# Patient Record
Sex: FEMALE | Race: WHITE | NOT HISPANIC OR LATINO | Employment: PART TIME | URBAN - METROPOLITAN AREA
[De-identification: names, ages, dates, MRNs, and addresses within clinical notes are randomized per-mention and may not be internally consistent; named-entity substitution may affect disease eponyms.]

---

## 2017-01-16 ENCOUNTER — HOSPITAL ENCOUNTER (EMERGENCY)
Facility: MEDICAL CENTER | Age: 37
End: 2017-01-16
Attending: EMERGENCY MEDICINE
Payer: COMMERCIAL

## 2017-01-16 VITALS
TEMPERATURE: 96.6 F | HEIGHT: 69 IN | RESPIRATION RATE: 16 BRPM | DIASTOLIC BLOOD PRESSURE: 74 MMHG | HEART RATE: 83 BPM | OXYGEN SATURATION: 100 % | WEIGHT: 190.48 LBS | BODY MASS INDEX: 28.21 KG/M2 | SYSTOLIC BLOOD PRESSURE: 118 MMHG

## 2017-01-16 DIAGNOSIS — T14.8XXA MUSCLE STRAIN: ICD-10-CM

## 2017-01-16 PROCEDURE — 700111 HCHG RX REV CODE 636 W/ 250 OVERRIDE (IP): Performed by: EMERGENCY MEDICINE

## 2017-01-16 PROCEDURE — 96372 THER/PROPH/DIAG INJ SC/IM: CPT

## 2017-01-16 PROCEDURE — 99283 EMERGENCY DEPT VISIT LOW MDM: CPT

## 2017-01-16 RX ORDER — HYDROCODONE BITARTRATE AND ACETAMINOPHEN 5; 325 MG/1; MG/1
1-2 TABLET ORAL EVERY 6 HOURS PRN
Qty: 10 TAB | Refills: 0 | Status: SHIPPED | OUTPATIENT
Start: 2017-01-16 | End: 2017-05-26

## 2017-01-16 RX ORDER — KETOROLAC TROMETHAMINE 30 MG/ML
30 INJECTION, SOLUTION INTRAMUSCULAR; INTRAVENOUS ONCE
Status: DISCONTINUED | OUTPATIENT
Start: 2017-01-16 | End: 2017-01-16

## 2017-01-16 RX ORDER — KETOROLAC TROMETHAMINE 10 MG/1
10 TABLET, FILM COATED ORAL EVERY 6 HOURS PRN
Qty: 22 TAB | Refills: 2 | Status: SHIPPED | OUTPATIENT
Start: 2017-01-16 | End: 2017-05-26

## 2017-01-16 RX ORDER — KETOROLAC TROMETHAMINE 30 MG/ML
30 INJECTION, SOLUTION INTRAMUSCULAR; INTRAVENOUS ONCE
Status: COMPLETED | OUTPATIENT
Start: 2017-01-16 | End: 2017-01-16

## 2017-01-16 RX ORDER — METHOCARBAMOL 750 MG/1
1500 TABLET, FILM COATED ORAL 3 TIMES DAILY
Qty: 10 TAB | Refills: 0 | Status: SHIPPED | OUTPATIENT
Start: 2017-01-16 | End: 2017-05-26

## 2017-01-16 RX ADMIN — KETOROLAC TROMETHAMINE 30 MG: 30 INJECTION, SOLUTION INTRAMUSCULAR; INTRAVENOUS at 06:01

## 2017-01-16 ASSESSMENT — PAIN SCALES - GENERAL: PAINLEVEL_OUTOF10: 4

## 2017-01-16 NOTE — ED NOTES
Reviewed discharge instructions with patient.  She verbalized understanding.  Able to ambulate out with her .

## 2017-01-16 NOTE — ED AVS SNAPSHOT
Roller Access Code: [email or web address]  Expires: 2/15/2017  6:23 AM    Roller  A secure, online tool to manage your health information     [employer]’s Roller® is a secure, online tool that connects you to your personalized health information from the privacy of your home -- day or night - making it very easy for you to manage your healthcare. Once the activation process is completed, you can even access your medical information using the Roller marivel, which is available for free in the Apple Marivel store or Google Play store.     Roller provides the following levels of access (as shown below):   My Chart Features   [facility] Primary Care Doctor [facility]  Specialists [facility]  Urgent  Care Non-[facility]  Primary Care  Doctor   Email your healthcare team securely and privately 24/7 X X X X   Manage appointments: schedule your next appointment; view details of past/upcoming appointments X      Request prescription refills. X      View recent personal medical records, including lab and immunizations X X X X   View health record, including health history, allergies, medications X X X X   Read reports about your outpatient visits, procedures, consult and ER notes X X X X   See your discharge summary, which is a recap of your hospital and/or ER visit that includes your diagnosis, lab results, and care plan. X X       How to register for Roller:  1. Go to  [email or web address].  2. Click on the Sign Up Now box, which takes you to the New Member Sign Up page. You will need to provide the following information:  a. Enter your Roller Access Code exactly as it appears at the top of this page. (You will not need to use this code after you’ve completed the sign-up process. If you do not sign up before the expiration date, you must request a new code.)   b. Enter your date of birth.   c. Enter your home email address.   d. Click Submit, and follow the next screen’s instructions.  3. Create a Roller ID. This will be your Roller  login ID and cannot be changed, so think of one that is secure and easy to remember.  4. Create a [facility] password. You can change your password at any time.  5. Enter your Password Reset Question and Answer. This can be used at a later time if you forget your password.   6. Enter your e-mail address. This allows you to receive e-mail notifications when new information is available in [facility].  7. Click Sign Up. You can now view your health information.    For assistance activating your [facility] account, call [phone]

## 2017-01-16 NOTE — ED AVS SNAPSHOT
After Visit Summary                                                                                                                Desire Mancini   MRN: [id number]    Department:  [facility], Emergency Dept   Date of Visit:  1/16/2017            [facility], Emergency Dept    [address]    [place] [address]    Phone:  [phone]      You were seen by     Dario Matt M.D.      Your Diagnosis Was     Muscle strain     T14.8       These are the medications you received during your hospitalization from 01/16/2017 0519 to 01/16/2017 0718     Date/Time Order Dose Route Action    01/16/2017 0601 ketorolac (TORADOL) injection 30 mg 30 mg Intramuscular Given      Follow-up Information     1. Follow up with Aristeo Mojica M.D.. Schedule an appointment as soon as possible for a visit today.    Specialty:  Family Medicine    Contact information    [address] [place] [address] [address] [place] [place] [address] [phone]        Medication Information     Review all of your home medications and newly ordered medications with your primary doctor and/or pharmacist as soon as possible. Follow medication instructions as directed by your doctor and/or pharmacist.     Please keep your complete medication list with you and share with your physician. Update the information when medications are discontinued, doses are changed, or new medications (including over-the-counter products) are added; and carry medication information at all times in the event of emergency situations.               Medication List      START taking these medications        Instructions    hydrocodone-acetaminophen 5-325 MG Tabs per tablet   Commonly known as:  NORCO    Take 1-2 Tabs by mouth every 6 hours as needed.   Dose:  1-2 Tab       ketorolac 10 MG Tabs   Commonly known as:  TORADOL    Take 1 Tab by mouth every 6 hours as needed for Mild Pain for up to 22 doses.   Dose:  10 mg       methocarbamol 750 MG Tabs      Commonly known as:  ROBAXIN    Take 2 Tabs by mouth 3 times a day.   Dose:  1500 mg         ASK your doctor about these medications        Instructions    ammonium lactate 12 % Lotn   Commonly known as:  LAC-HYDRIN    Apply 1 Application to affected area(s) 2 times a day as needed.   Dose:  1 Application       SPIRONOLACTONE PO    Take  by mouth 2 Times a Day.                 Discharge Instructions       Ligament Sprain  Ligaments are tough, fibrous tissues that hold bones together at the joints. A sprain can occur when a ligament is stretched. This injury may take several weeks to heal.  HOME CARE INSTRUCTIONS   · Rest the injured area for as long as directed by your caregiver. Then slowly start using the joint as directed by your caregiver and as the pain allows.  · Keep the affected joint raised if possible to lessen swelling.  · Apply ice for 15-20 minutes to the injured area every couple hours for the first half day, then 3-4 times per day for the first 48 hours. Put the ice in a plastic bag and place a towel between the bag of ice and your skin.  · Wear any splinting, casting, or elastic bandage applications as instructed.  · Only take over-the-counter or prescription medicines for pain, discomfort, or fever as directed by your caregiver. Do not use aspirin immediately after the injury unless instructed by your caregiver. Aspirin can cause increased bleeding and bruising of the tissues.  · If you were given crutches, continue to use them as instructed and do not resume weight bearing on the affected extremity until instructed.  SEEK MEDICAL CARE IF:   · Your bruising, swelling, or pain increases.  · You have cold and numb fingers or toes if your arm or leg was injured.  SEEK IMMEDIATE MEDICAL CARE IF:   · Your toes are numb or blue if your leg was injured.  · Your fingers are numb or blue if your arm was injured.  · Your pain is not responding to medicines and continues to stay the same or gets worse.  MAKE  SURE YOU:   · Understand these instructions.  · Will watch your condition.  · Will get help right away if you are not doing well or get worse.  Document Released: 12/15/2001 Document Revised: 03/11/2013 Document Reviewed: 10/13/2009  ExitCare® Patient Information ©2014 [employer].            Patient Information     Patient Information    Following emergency treatment: all patient requiring follow-up care must return either to a private physician or a clinic if your condition worsens before you are able to obtain further medical attention, please return to the emergency room.     Billing Information    At [facility], we work to make the billing process streamlined for our patients.  Our Representatives are here to answer any questions you may have regarding your hospital bill.  If you have insurance coverage and have supplied your insurance information to us, we will submit a claim to your insurer on your behalf.  Should you have any questions regarding your bill, we can be reached online or by phone as follows:  Online: You are able pay your bills online or live chat with our representatives about any billing questions you may have. We are here to help Monday - Friday from 8:00am to 7:30pm and 9:00am - 12:00pm on Saturdays.  Please visit [email or web address]  for more information.   Phone:  [phone] or [phone]    Please note that your emergency physician, surgeon, pathologist, radiologist, anesthesiologist, and other specialists are not employed by [facility] and will therefore bill separately for their services.  Please contact them directly for any questions concerning their bills at the numbers below:     Emergency Physician Services:  [phone]  [facility]:  [phone]  [facility]:  [phone]  [facility]:  [phone]    1. Your final bill may vary from the amount quoted upon discharge if all procedures are  not complete at that time, or if your doctor has additional procedures of which we are not aware. You will receive an additional bill if you return to the Emergency Department at [facility] for suture removal regardless of the facility of which the sutures were placed.     2. Please arrange for settlement of this account at the emergency registration.    3. All self-pay accounts are due in full at the time of treatment.  If you are unable to meet this obligation then payment is expected within 4-5 days.     4. If you have had radiology studies (CT, X-ray, Ultrasound, MRI), you have received a preliminary result during your emergency department visit. Please contact the radiology department [phone] to receive a copy of your final result. Please discuss the Final result with your primary physician or with the follow up physician provided.     Crisis Hotline:  [place] Crisis Hotline:  8-043-DNVCXHU or 1-905.805.3506  [place] Crisis Hotline:    1-238.441.7810 or 657-759-9865         ED Discharge Follow Up Questions    1. In order to provide you with very good care, we would like to follow up with a phone call in the next few days.  May we have your permission to contact you?     YES /  NO    2. What is the best phone number to call you? (       )_____-__________    3. What is the best time to call you?      Morning  /  Afternoon  /  Evening                   Patient Signature:  ____________________________________________________________    Date:  ____________________________________________________________      Your appointments     Jan 26, 2017 10:20 AM   Established Patient with Aristeo Mojica M.D.   [facility] (--)    [address]  [address]  [facility] [address]   [phone]           You will be receiving a confirmation call a few days before your appointment from our automated call confirmation system.

## 2017-01-16 NOTE — DISCHARGE INSTRUCTIONS
Ligament Sprain  Ligaments are tough, fibrous tissues that hold bones together at the joints. A sprain can occur when a ligament is stretched. This injury may take several weeks to heal.  HOME CARE INSTRUCTIONS   · Rest the injured area for as long as directed by your caregiver. Then slowly start using the joint as directed by your caregiver and as the pain allows.  · Keep the affected joint raised if possible to lessen swelling.  · Apply ice for 15-20 minutes to the injured area every couple hours for the first half day, then 3-4 times per day for the first 48 hours. Put the ice in a plastic bag and place a towel between the bag of ice and your skin.  · Wear any splinting, casting, or elastic bandage applications as instructed.  · Only take over-the-counter or prescription medicines for pain, discomfort, or fever as directed by your caregiver. Do not use aspirin immediately after the injury unless instructed by your caregiver. Aspirin can cause increased bleeding and bruising of the tissues.  · If you were given crutches, continue to use them as instructed and do not resume weight bearing on the affected extremity until instructed.  SEEK MEDICAL CARE IF:   · Your bruising, swelling, or pain increases.  · You have cold and numb fingers or toes if your arm or leg was injured.  SEEK IMMEDIATE MEDICAL CARE IF:   · Your toes are numb or blue if your leg was injured.  · Your fingers are numb or blue if your arm was injured.  · Your pain is not responding to medicines and continues to stay the same or gets worse.  MAKE SURE YOU:   · Understand these instructions.  · Will watch your condition.  · Will get help right away if you are not doing well or get worse.  Document Released: 12/15/2001 Document Revised: 03/11/2013 Document Reviewed: 10/13/2009  ExitCare® Patient Information ©2014 [employer].

## 2017-01-16 NOTE — ED NOTES
Pt brought back to rm GR 27 from triage. Pt able to transfer self to bed, call light in reach. Chart up for ERP.

## 2017-01-16 NOTE — ED AVS SNAPSHOT
1/16/2017          Desire Mancini  [address] [place] [address]    Dear Desire:    [facility] wants to ensure your discharge home is safe and you or your loved ones have had all your questions answered regarding your care after you leave the hospital.    You may receive a telephone call within two days of your discharge.  This call is to make certain you understand your discharge instructions as well as ensure we provided you with the best care possible during your stay with us.     The call will only last approximately 3-5 minutes and will be done by a nurse.    Once again, we want to ensure your discharge home is safe and that you have a clear understanding of any next steps in your care.  If you have any questions or concerns, please do not hesitate to contact us, we are here for you.  Thank you for choosing [facility] for your healthcare needs.    Sincerely,    Calderon Adamson    [facility]

## 2017-01-16 NOTE — ED PROVIDER NOTES
"ED Provider Note    CHIEF COMPLAINT  Chief Complaint   Patient presents with   • Neck Pain     right sided       HPI  Desire Mancini is a 36 y.o. female who presents with sudden onset neck pain, mostly on the right when she rolled over in bed tonight. Did not fall out of bed. No recent trauma no motor vehicle accidents no falls. Only rolled over in bed and had sudden onset right neck pain. No difficulty rotating her neck. No similar episodes. States she is not pregnant. No paresthesias in her arms or legs. No weakness    REVIEW OF SYSTEMS  See HPI for further details.     PAST MEDICAL HISTORY  Past Medical History   Diagnosis Date   • Acne          SOCIAL HISTORY        CURRENT MEDICATIONS  Home Medications     Reviewed by Sonya Rose R.N. (Registered Nurse) on 01/16/17 at 0531  Med List Status: Partial    Medication Last Dose Status    ammonium lactate (LAC-HYDRIN) 12 % Lotion Unknown Active    SPIRONOLACTONE PO  Active                ALLERGIES  No Known Allergies    PHYSICAL EXAM  VITAL SIGNS: /74 mmHg  Pulse 83  Temp(Src) 35.9 °C (96.6 °F)  Resp 16  Ht 1.753 m (5' 9\")  Wt 86.4 kg (190 lb 7.6 oz)  BMI 28.12 kg/m2  SpO2 100%  Constitutional: Well developed, Well nourished, appears to be in acute pain  HENT: Normocephalic, Atraumatic, Bilateral external ears normal, Oropharynx moist, No oral exudates, Nose normal.   Eyes: PERRLA, EOMI, Conjunctiva normal, No discharge.   Neck: Normal range of motion, No tenderness, Supple, No stridor. Neck is nontender but pain with any rotation right or left, pain with flexion and extension  Cardiovascular:  Heart sounds are normal no murmurs or rubs  Thorax & Lungs: Lungs are clear equal breath hands bilaterally no rhonchi rales or wheezes. Chest wall motion is nonlabored  .   Skin: Warm, Dry, No erythema, No rash.   Neurologic: Alert & oriented x 3, Normal motor function, Normal sensory function, No focal deficits noted.         COURSE & MEDICAL DECISION " MAKING  Pertinent Labs & Imaging studies reviewed. (See chart for details)    She rolled over in bed tonight, sudden onset right neck pain. No similar episodes. No recent trauma. States she is not pregnant, was given Toradol IV.    She had good relief with Toradol however still having some more pain. I explained to her that I do not think x-rays are necessary at this time.. She will go home, heat massage, also prescription for Toradol, Norco, Robaxin, I have checked with   FINAL IMPRESSION  1.  1. Muscle strain        2.   3.    Disposition:  Discharge instructions are understood. This patient is to return if fever vomiting or no better in 12 hours. Follow up with the [facility] clinic or private physician. Information sheets on muscle strain    Electronically signed by: Dario Matt, 1/16/2017 5:45 AM

## 2017-01-26 ENCOUNTER — OFFICE VISIT (OUTPATIENT)
Dept: MEDICAL GROUP | Facility: MEDICAL CENTER | Age: 37
End: 2017-01-26
Payer: COMMERCIAL

## 2017-01-26 VITALS
SYSTOLIC BLOOD PRESSURE: 112 MMHG | BODY MASS INDEX: 27.99 KG/M2 | OXYGEN SATURATION: 98 % | TEMPERATURE: 97.5 F | HEIGHT: 69 IN | RESPIRATION RATE: 20 BRPM | DIASTOLIC BLOOD PRESSURE: 72 MMHG | HEART RATE: 78 BPM | WEIGHT: 189 LBS

## 2017-01-26 DIAGNOSIS — G47.30 SLEEP APNEA, UNSPECIFIED TYPE: ICD-10-CM

## 2017-01-26 DIAGNOSIS — S16.1XXA NECK MUSCLE STRAIN, INITIAL ENCOUNTER: ICD-10-CM

## 2017-01-26 PROCEDURE — 99214 OFFICE O/P EST MOD 30 MIN: CPT | Performed by: FAMILY MEDICINE

## 2017-01-26 ASSESSMENT — PATIENT HEALTH QUESTIONNAIRE - PHQ9: CLINICAL INTERPRETATION OF PHQ2 SCORE: 2

## 2017-01-26 NOTE — MR AVS SNAPSHOT
"Desire Mancini   2017 10:20 AM   Office Visit   MRN: [id number]    Department:  [facility]   Dept Phone:  [phone]    Description:  Female : 1980   Provider:  Aristeo Mojica M.D.           Reason for Visit     Other patient states she has sleep apnea    Other patient was seen in ER for Pinch Nerve      Allergies as of 2017     No Known Allergies      You were diagnosed with     Sleep apnea, unspecified type   [[id number]]         Vital Signs     Blood Pressure Pulse Temperature Respirations Height Weight    112/72 mmHg 78 36.4 °C (97.5 °F) 20 1.753 m (5' 9\") 85.73 kg (189 lb)    Body Mass Index Oxygen Saturation Last Menstrual Period Breastfeeding? Smoking Status       27.90 kg/m2 98% 01/15/2017 No Never Smoker        Basic Information     Date Of Birth Sex Race Ethnicity Preferred Language    1980 Female White Non- English      Health Maintenance        Date Due Completion Dates    IMM INFLUENZA (1) 2018 (Originally 2016) ---    IMM DTaP/Tdap/Td Vaccine (1 - Tdap) 2018 (Originally 1999) ---    PAP SMEAR 3/1/2018 3/1/2015 (Done)    Override on 3/1/2015: Done (Dr. [facility] [place] [facility])            Current Immunizations     No immunizations on file.      Below and/or attached are the medications your provider expects you to take. Review all of your home medications and newly ordered medications with your provider and/or pharmacist. Follow medication instructions as directed by your provider and/or pharmacist. Please keep your medication list with you and share with your provider. Update the information when medications are discontinued, doses are changed, or new medications (including over-the-counter products) are added; and carry medication information at all times in the event of emergency situations     Allergies:  No Known Allergies          Medications  Valid as of: 2017 - 10:52 AM    Generic Name Brand Name Tablet Size " Instructions for use    Ammonium Lactate (Lotion) LAC-HYDRIN 12 % Apply 1 Application to affected area(s) 2 times a day as needed.        Hydrocodone-Acetaminophen (Tab) NORCO 5-325 MG Take 1-2 Tabs by mouth every 6 hours as needed.        Ketorolac Tromethamine (Tab) TORADOL 10 MG Take 1 Tab by mouth every 6 hours as needed for Mild Pain for up to 22 doses.        Methocarbamol (Tab) ROBAXIN 750 MG Take 2 Tabs by mouth 3 times a day.        Spironolactone   Take  by mouth 2 Times a Day.        .                 Medicines prescribed today were sent to:     [id number] - [place], [place] - [address]    [address] [place] [address]    Phone: [phone] Fax: [phone]    Open 24 Hours?: No      Medication refill instructions:       If your prescription bottle indicates you have medication refills left, it is not necessary to call your provider’s office. Please contact your pharmacy and they will refill your medication.    If your prescription bottle indicates you do not have any refills left, you may request refills at any time through one of the following ways: The online [employer] system (except Urgent Care), by calling your provider’s office, or by asking your pharmacy to contact your provider’s office with a refill request. Medication refills are processed only during regular business hours and may not be available until the next business day. Your provider may request additional information or to have a follow-up visit with you prior to refilling your medication.   *Please Note: Medication refills are assigned a new Rx number when refilled electronically. Your pharmacy may indicate that no refills were authorized even though a new prescription for the same medication is available at the pharmacy. Please request the medicine by name with the pharmacy before contacting your provider for a refill.        Referral     A referral request has been sent to our patient care coordination department. Please allow  3-5 business days for us to process this request and contact you either by phone or mail. If you do not hear from us by the 5th business day, please call us at [phone].           [employer] Access Code: [email or web address]  Expires: 2/15/2017  6:23 AM    [employer]  A secure, online tool to manage your health information     [employer]’s Applied Cavitation® is a secure, online tool that connects you to your personalized health information from the privacy of your home -- day or night - making it very easy for you to manage your healthcare. Once the activation process is completed, you can even access your medical information using the [employer] marivel, which is available for free in the Apple Marivel store or Google Play store.     [employer] provides the following levels of access (as shown below):   My Chart Features   [facility] Primary [facility] Doctor [facility]  Specialists [facility]  Urgent  Care Non-[facility]  Primary Care  Doctor   Email your healthcare team securely and privately 24/7 X X X    Manage appointments: schedule your next appointment; view details of past/upcoming appointments X      Request prescription refills. X      View recent personal medical records, including lab and immunizations X X X X   View health record, including health history, allergies, medications X X X X   Read reports about your outpatient visits, procedures, consult and ER notes X X X X   See your discharge summary, which is a recap of your hospital and/or ER visit that includes your diagnosis, lab results, and care plan. X X       How to register for Applied Cavitation:  1. Go to  [email or web address].  2. Click on the Sign Up Now box, which takes you to the New Member Sign Up page. You will need to provide the following information:  a. Enter your Applied Cavitation Access Code exactly as it appears at the top of this page. (You will not need to use this code after you’ve completed the sign-up process. If you do not sign up before the expiration date, you must request a new  code.)   b. Enter your date of birth.   c. Enter your home email address.   d. Click Submit, and follow the next screen’s instructions.  3. Create a [employer] ID. This will be your [employer] login ID and cannot be changed, so think of one that is secure and easy to remember.  4. Create a [employer] password. You can change your password at any time.  5. Enter your Password Reset Question and Answer. This can be used at a later time if you forget your password.   6. Enter your e-mail address. This allows you to receive e-mail notifications when new information is available in [employer].  7. Click Sign Up. You can now view your health information.    For assistance activating your [employer] account, call [phone]

## 2017-01-27 NOTE — PROGRESS NOTES
Chief Complaint   Patient presents with   • Other     patient states she has sleep apnea   • Other     patient was seen in ER for Pinch Nerve     Multiple medical problems    HISTORY OF PRESENT ILLNESS: Patient is a 36 y.o. female established patient who presents today for the following.      1. Sleep apnea, unspecified type  Patient has witnessed at  episodes and snoring. She is continuously fatigued primarily during midday. She feels like she sleeps well however. We discussed sleep study. She did have one approximately 4 years ago which did not reveal sleep apnea.    2. Neck muscle strain, initial encounter  This is a new problem for the patient. She was sleeping on her neck and awoke with severe pain going into the right scapula. She went to the emergency room and had workup. She states that it is virtually resolved itself. I discussed if it resolved we would not do anything further, but if it persists may need to consider imaging.      No problem-specific assessment & plan notes found for this encounter.      She  has a past medical history of Acne.    There are no active problems to display for this patient.      Allergies:Review of patient's allergies indicates no known allergies.    Current Outpatient Prescriptions   Medication Sig Dispense Refill   • ammonium lactate (LAC-HYDRIN) 12 % Lotion Apply 1 Application to affected area(s) 2 times a day as needed. 1 Bottle 2   • SPIRONOLACTONE PO Take  by mouth 2 Times a Day.     • hydrocodone-acetaminophen (NORCO) 5-325 MG Tab per tablet Take 1-2 Tabs by mouth every 6 hours as needed. 10 Tab 0   • ketorolac (TORADOL) 10 MG Tab Take 1 Tab by mouth every 6 hours as needed for Mild Pain for up to 22 doses. 22 Tab 2   • methocarbamol (ROBAXIN) 750 MG Tab Take 2 Tabs by mouth 3 times a day. 10 Tab 0     No current facility-administered medications for this visit.       Social History   Substance Use Topics   • Smoking status: Never Smoker    • Smokeless tobacco: Never  "Used   • Alcohol Use: Yes      Comment: occ       No family history on file.    Health Maintenance:      Review of Systems   No fever, chills, nausea, vomiting, diarrhea, chest pain or shortness of breath.  See HPI    Exam:  Blood pressure 112/72, pulse 78, temperature 36.4 °C (97.5 °F), resp. rate 20, height 1.753 m (5' 9\"), weight 85.73 kg (189 lb), last menstrual period 01/15/2017, SpO2 98 %, not currently breastfeeding. Body mass index is 27.9 kg/(m^2).  Constitutional:  NAD, well appearing.  HEENT:   NC/AT, PERRLA, EOMI, OP clear, no lymphadenopathy, no thyromegaly.  Mallampati IV  Cardiovascular: RRR.   No m/r/g. No carotid bruits.       Lungs:   CTAB, no w/r/r, no respiratory distress.  Abdomen: Soft, NT/ND + BS, no masses, no suprapubic tenderness, no hepatomegaly.  Extremities:  2+ DP and radial pulses bilaterally.  No c/c/e.  Skin:  Warm and dry.    Neurologic: Alert & oriented x 3, CN II-XII grossly intact, strength and sensation grossly intact.  No focal deficits noted.  Psychiatric:  Affect normal, mood normal, judgment normal.    Assessment/Plan:     1. Sleep apnea, unspecified type  Patient has with witnessed apneic episodes, snoring and a Mallin potty score of 3-4. Very indicative of sleep apnea. Sleep study has been ordered. Monitor  - REFERRAL TO SLEEP STUDIES    2. Neck muscle strain, initial encounter  This is a new problem for the patient. Discussed with the patient continuing her muscle relaxant and anti-inflammatory. Patient seems to be improving. No imaging warranted at this point.        Followup: No Follow-up on file.    Please note that this dictation was created using voice recognition software. I have made every reasonable attempt to correct obvious errors, but I expect that there are errors of grammar and possibly content that I did not discover before finalizing the note.      "

## 2017-04-03 ENCOUNTER — SLEEP CENTER VISIT (OUTPATIENT)
Dept: SLEEP MEDICINE | Facility: MEDICAL CENTER | Age: 37
End: 2017-04-03
Payer: COMMERCIAL

## 2017-04-03 VITALS
DIASTOLIC BLOOD PRESSURE: 60 MMHG | RESPIRATION RATE: 16 BRPM | WEIGHT: 187 LBS | TEMPERATURE: 97.9 F | HEIGHT: 69 IN | HEART RATE: 88 BPM | BODY MASS INDEX: 27.7 KG/M2 | SYSTOLIC BLOOD PRESSURE: 98 MMHG | OXYGEN SATURATION: 97 %

## 2017-04-03 DIAGNOSIS — G47.33 OBSTRUCTIVE SLEEP APNEA: ICD-10-CM

## 2017-04-03 PROCEDURE — 99244 OFF/OP CNSLTJ NEW/EST MOD 40: CPT | Performed by: INTERNAL MEDICINE

## 2017-04-03 RX ORDER — ZOLPIDEM TARTRATE 5 MG/1
TABLET ORAL
Qty: 3 TAB | Refills: 0 | Status: SHIPPED | OUTPATIENT
Start: 2017-04-03 | End: 2017-05-26

## 2017-04-03 RX ORDER — DAPSONE 50 MG/G
GEL TOPICAL
COMMUNITY
Start: 2017-02-07 | End: 2018-08-22

## 2017-04-03 RX ORDER — FLUCONAZOLE 150 MG/1
TABLET ORAL
COMMUNITY
Start: 2017-03-22 | End: 2017-03-28

## 2017-04-03 NOTE — PROGRESS NOTES
CC: Kindly referred for evaluation of sleep apnea    HPI:     Ms. Mancini is a 36-year-old female referred by Dr. Mojica for reevaluation of obstructive sleep apnea syndrome. The patient had a prior sleep study done in 2012 which did not show significant apnea. Her apnea hypopnea index was 2.0 and her lowest saturation was 92% on this prior study.    The patient's signs and symptoms include daytime fatigue and sleepiness, witnessed apneas, waking up gasping for air and choking, feeling like her throat is closing, tiredness no matter how much sleep she gets, too little sleep at night, difficulty breathing at night, loud and disturbing snoring, restless legs, sleep talking, teeth grinding, and morning headaches. Her father has been diagnosed to have sleep apnea and uses CPAP. Sx may be sporadic.    The episodes of nocturnal choking are so scary that she might want treatment with cpap even if she doesn't qualify by exceeding the AHI of 5.0.    Her [place] score is 10.                     There are no active problems to display for this patient.      Past Medical History   Diagnosis Date   • Acne         No past surgical history on file.    No family history on file.    Social History     Social History   • Marital Status:      Spouse Name: N/A   • Number of Children: N/A   • Years of Education: N/A     Occupational History   • Not on file.     Social History Main Topics   • Smoking status: Never Smoker    • Smokeless tobacco: Never Used   • Alcohol Use: 0.0 oz/week     0 Standard drinks or equivalent per week      Comment: occ   • Drug Use: No   • Sexual Activity: Not on file     Other Topics Concern   • Not on file     Social History Narrative       Current Outpatient Prescriptions   Medication Sig Dispense Refill   • ACZONE 5 % Gel      • SPIRONOLACTONE PO Take  by mouth 2 Times a Day.     • hydrocodone-acetaminophen (NORCO) 5-325 MG Tab per tablet Take 1-2 Tabs by mouth every 6 hours as needed. 10 Tab 0   •  "ketorolac (TORADOL) 10 MG Tab Take 1 Tab by mouth every 6 hours as needed for Mild Pain for up to 22 doses. 22 Tab 2   • methocarbamol (ROBAXIN) 750 MG Tab Take 2 Tabs by mouth 3 times a day. 10 Tab 0   • ammonium lactate (LAC-HYDRIN) 12 % Lotion Apply 1 Application to affected area(s) 2 times a day as needed. 1 Bottle 2     No current facility-administered medications for this visit.    \"CURRENT RX\"    ALLERGIES: Review of patient's allergies indicates no known allergies.    ROS  Constitutional: Denies fever, chills, sweats, fatigue, weight loss.   Eyes: Denies glasses, vision loss, pain, drainage, double vision.   Ears/Nose/Mouth/Throat: Denies earache, difficulty hearing, ringing/buzzing in ears, rhinitis/nasal congestion, injury, persistent hoarseness, decayed teeth/toothaches. [place] of recurrent sore throats  Cardiovascular: Denies chest pain, tightness, palpitations, swelling in legs/feet, fainting, difficulty breathing when lying down but gets better when sitting up.   Respiratory: Denies shortness of breath, cough, sputum, wheezing, painful breathing, coughing up blood.   Sleep: See history of present illness  Gastrointestinal: Denies heartburn, difficulty swallowing, nausea, abdominal pain, diarrhea, constipation.   Genitourinary: Denies frequent urination, painful urination, blood in urine, discharge.   Musculoskeletal: Denies painful joints, sore muscles, back pain.   Integumentary: Denies rashes, lumps, color changes.   Neurological: Denies dizziness, weakness. Complains of frequent headaches    PHYSICAL EXAM    Ht 1.753 m (5' 9.02\")  Wt 84.823 kg (187 lb)  BMI 27.60 kg/m2  Appearance: Well-nourished, well-developed, no acute distress  Eyes:  PERRLA, EOMI  Hearing:  Grossly intact  Nose:  Normal, no lesions or deformities, turbinates moist  Oropharynx:  Tongue normal, posterior pharynx without erythema or exudate  Mallampati classification: 2   Neck: Supple, trachea midline, no masses  Respiratory " effort:  No intercostal retractions or use of accessory muscles  Lung auscultation:  No wheezes rhonchi rubs or rales  Cardiac auscultation:  No murmurs, rubs, or gallops, no regular rhythm, normal rate  Abdomen:  No tenderness, no organomegaly  Extremities:  No cyanosis, clubbing, edema  Gait and Station:  Normal  Digits and nails: No clubbing, cyanosis, petechiae, or nodes  Musculoskeletal:  Grossly normal  Skin:  No rashes  Orientation:  Oriented time, place, and person  Mood and affect:  No depression, anxiety, agitation  Judgment:  Intact    PROBLEMS:  1. Obstructive sleep apnea    - POLYSOMNOGRAPHY, 4 OR MORE; Future  - zolpidem (AMBIEN) 5 MG Tab; Take 1-2 tablets by mouth every evening as needed for insomnia. Bring to sleep study.  Dispense: 3 Tab; Refill: 0      PLAN:     The patient has signs and symptoms consistent with obstructive sleep apnea hypopnea syndrome. Will schedule to have a nocturnal polysomnogram using zolpidem to assist with sleep onset and maintenance should the need arise. Will return after the results are available to determine further diagnostic needs and/or treatment options.    The risks of untreated sleep apnea were discussed with the patient at length. Patients with AMELIA are at increased risk of cardiovascular disease including coronary artery disease, systemic arterial hypertension, pulmonary arterial hypertension, cardiac arrythmias, and stroke. AMELIA patients have an increased risk of motor vehicle accidents, type 2 diabetes, chronic kidney disease, and non-alcoholic liver disease. The patient was advised to avoid driving a motor vehicle when drowsy.

## 2017-04-03 NOTE — MR AVS SNAPSHOT
"        Desire Mancini   4/3/2017 10:00 AM   Sleep Center Visit   MRN: [id number]    Department:  [facility]   Dept Phone:  [phone]    Description:  Female : 1980   Provider:  Ced Wang M.D.           Reason for Visit     New Patient Re-Evaluate AMELIA; Last sleep study  with [facility]      Allergies as of 4/3/2017     No Known Allergies      You were diagnosed with     Obstructive sleep apnea   [747970]         Vital Signs     Blood Pressure Pulse Temperature Respirations Height Weight    98/60 mmHg 88 36.6 °C (97.9 °F) 16 1.753 m (5' 9.02\") 84.823 kg (187 lb)    Body Mass Index Oxygen Saturation Smoking Status             27.60 kg/m2 97% Passive Smoke Exposure - Never Smoker         Basic Information     Date Of Birth Sex Race Ethnicity Preferred Language    1980 Female White Non- English      Your appointments     May 18, 2017  9:05 PM   Sleep Study Diagnostic with SLEEP TECH   [facility] (--)    [address]  [facility] [address]  [email or web address] [address]   [phone]            May 26, 2017 11:20 AM   Follow UP with ARIANNE Powers   [facility] (--)    [address]  [email or web address] [id number] [phone]              Problem List              ICD-10-CM Priority Class Noted - Resolved    Obstructive sleep apnea G47.33   4/3/2017 - Present      Health Maintenance        Date Due Completion Dates    IMM DTaP/Tdap/Td Vaccine (1 - Tdap) 2018 (Originally 1999) ---    PAP SMEAR 3/1/2018 3/1/2015 (Done)    Override on 3/1/2015: Done (Dr. Dat Alas [facility]ynoc[facility])            Current Immunizations     No immunizations on file.      Below and/or attached are the medications your provider expects you to take. Review all of your home medications and newly ordered medications with your provider and/or pharmacist. Follow medication instructions as directed by your provider and/or pharmacist. Please keep your " medication list with you and share with your provider. Update the information when medications are discontinued, doses are changed, or new medications (including over-the-counter products) are added; and carry medication information at all times in the event of emergency situations     Allergies:  No Known Allergies          Medications  Valid as of: April 03, 2017 - 10:42 AM    Generic Name Brand Name Tablet Size Instructions for use    Ammonium Lactate (Lotion) LAC-HYDRIN 12 % Apply 1 Application to affected area(s) 2 times a day as needed.        Dapsone (Gel) ACZONE 5 %         Hydrocodone-Acetaminophen (Tab) NORCO 5-325 MG Take 1-2 Tabs by mouth every 6 hours as needed.        Ketorolac Tromethamine (Tab) TORADOL 10 MG Take 1 Tab by mouth every 6 hours as needed for Mild Pain for up to 22 doses.        Methocarbamol (Tab) ROBAXIN 750 MG Take 2 Tabs by mouth 3 times a day.        Zolpidem Tartrate (Tab) AMBIEN 5 MG Take 1-2 tablets by mouth every evening as needed for insomnia. Bring to sleep study.        .                 Medicines prescribed today were sent to:     [facility] - [place], [place] - [address]    [address] [place] [address]    Phone: [phone] Fax: [phone]    Open 24 Hours?: No      Medication refill instructions:       If your prescription bottle indicates you have medication refills left, it is not necessary to call your provider’s office. Please contact your pharmacy and they will refill your medication.    If your prescription bottle indicates you do not have any refills left, you may request refills at any time through one of the following ways: The online [employer] system (except Urgent Care), by calling your provider’s office, or by asking your pharmacy to contact your provider’s office with a refill request. Medication refills are processed only during regular business hours and may not be available until the next business day. Your provider may request additional  information or to have a follow-up visit with you prior to refilling your medication.   *Please Note: Medication refills are assigned a new Rx number when refilled electronically. Your pharmacy may indicate that no refills were authorized even though a new prescription for the same medication is available at the pharmacy. Please request the medicine by name with the pharmacy before contacting your provider for a refill.        Your To Do List     Future Labs/Procedures Complete By Expires    POLYSOMNOGRAPHY, 4 OR MORE  As directed 4/3/2018         [facility] Status: Patient Declined

## 2017-05-19 ENCOUNTER — SLEEP STUDY (OUTPATIENT)
Dept: SLEEP MEDICINE | Facility: MEDICAL CENTER | Age: 37
End: 2017-05-19
Attending: INTERNAL MEDICINE
Payer: COMMERCIAL

## 2017-05-19 DIAGNOSIS — G47.33 OBSTRUCTIVE SLEEP APNEA: ICD-10-CM

## 2017-05-19 PROCEDURE — 95810 POLYSOM 6/> YRS 4/> PARAM: CPT | Performed by: INTERNAL MEDICINE

## 2017-05-19 NOTE — MR AVS SNAPSHOT
Desire Louis Live   2017 8:05 PM   Sleep Study   MRN: [id number]    Department:  [facility]   Dept Phone:  [phone]    Description:  Female : 1980   Provider:  Chinyere Mcdowell M.D.           Allergies as of 2017     No Known Allergies      You were diagnosed with     Obstructive sleep apnea   [490607]         Vital Signs     Smoking Status                   Passive Smoke Exposure - Never Smoker           Basic Information     Date Of Birth Sex Race Ethnicity Preferred Language    1980 Female White Non- English      Your appointments     May 26, 2017 11:20 AM   Follow UP with ARIANNE Powers   [facility] (--)    [address]  [place] [id number] [phone]              Problem List              ICD-10-CM Priority Class Noted - Resolved    Obstructive sleep apnea G47.33   4/3/2017 - Present      Health Maintenance        Date Due Completion Dates    IMM DTaP/Tdap/Td Vaccine (1 - Tdap) 2018 (Originally 1999) ---    PAP SMEAR 3/1/2018 3/1/2015 (Done)    Override on 3/1/2015: Done (Dr. Dat Alas Gynocology)            Current Immunizations     No immunizations on file.      Below and/or attached are the medications your provider expects you to take. Review all of your home medications and newly ordered medications with your provider and/or pharmacist. Follow medication instructions as directed by your provider and/or pharmacist. Please keep your medication list with you and share with your provider. Update the information when medications are discontinued, doses are changed, or new medications (including over-the-counter products) are added; and carry medication information at all times in the event of emergency situations     Allergies:  No Known Allergies          Medications  Valid as of: May 20, 2017 -  6:17 AM    Generic Name Brand Name Tablet Size Instructions for use    Ammonium Lactate (Lotion)  LAC-HYDRIN 12 % Apply 1 Application to affected area(s) 2 times a day as needed.        Dapsone (Gel) ACZONE 5 %         Hydrocodone-Acetaminophen (Tab) NORCO 5-325 MG Take 1-2 Tabs by mouth every 6 hours as needed.        Ketorolac Tromethamine (Tab) TORADOL 10 MG Take 1 Tab by mouth every 6 hours as needed for Mild Pain for up to 22 doses.        Methocarbamol (Tab) ROBAXIN 750 MG Take 2 Tabs by mouth 3 times a day.        Zolpidem Tartrate (Tab) AMBIEN 5 MG Take 1-2 tablets by mouth every evening as needed for insomnia. Bring to sleep study.        .                 Medicines prescribed today were sent to:     [facility] - [place], [place] - [address]    [address] [place] [address]    Phone: [phone] Fax: [phone]    Open 24 Hours?: No      Medication refill instructions:       If your prescription bottle indicates you have medication refills left, it is not necessary to call your provider’s office. Please contact your pharmacy and they will refill your medication.    If your prescription bottle indicates you do not have any refills left, you may request refills at any time through one of the following ways: The online [employer] system (except Urgent Care), by calling your provider’s office, or by asking your pharmacy to contact your provider’s office with a refill request. Medication refills are processed only during regular business hours and may not be available until the next business day. Your provider may request additional information or to have a follow-up visit with you prior to refilling your medication.   *Please Note: Medication refills are assigned a new Rx number when refilled electronically. Your pharmacy may indicate that no refills were authorized even though a new prescription for the same medication is available at the pharmacy. Please request the medicine by name with the pharmacy before contacting your provider for a refill.           MyChart Status: Patient Declined

## 2017-05-22 NOTE — PROCEDURES
Clinical Comments:  The patient underwent a comprehensive polysomnogram using the standard montage for measurement of parameters of sleep, respiratory events, movement abnormalities, heart rate and rhythm. A microphone was used to monitor snoring.      INTERPRETATION:  The total recording time was 387.6 minutes with a sleep period of 380.3 minutes and the total sleep time was 371.8 minutes with a sleep efficiency of 95.9%.  The sleep latency was 7.3 minutes, and REM latency was 141.0 minutes.  The patient experienced 86 arousals in total, for an arousal index of 13.9    RESPIRATORY: The patient had 7 apneas in total.  Of these, 6 were obstructive apneas, and 1 were central apneas.  This resulted in an apnea index (AI) of 1.1.  The patient had 10 hypopneas, for a hypopnea index of 1.6.  The overall AHI was 2.7, while the AHI during Stage R sleep was 6.6.  AHI while supine was 2.7.    OXIMETRY: Oxygen saturation monitoring showed a mean SpO2 of 95.3%, with a minimum oxygen saturation of 91.0%.  Oxygen saturations were less than or = 89% for 0.0 minutes of sleep time.    CARDIAC: The highest heart rate during the recording was 106.0 beats per minute.  The average heart rate during sleep was 61.0 bpm.    LIMB MOVEMENTS: There were a total of 0 PLMs during sleep, of which 0 were PLMs arousals.  This resulted in a PLMS index of 0.0.    Overnight sleep study done May 19 revealed evidence of excellent sleep efficiency, 97% with a total sleep time of 6.2 hours. All stages of sleep observed. Limb movements not evident. No evidence of sleep-disordered breathing was seen

## 2017-05-26 ENCOUNTER — SLEEP CENTER VISIT (OUTPATIENT)
Dept: SLEEP MEDICINE | Facility: MEDICAL CENTER | Age: 37
End: 2017-05-26
Payer: COMMERCIAL

## 2017-05-26 VITALS
WEIGHT: 188 LBS | RESPIRATION RATE: 16 BRPM | SYSTOLIC BLOOD PRESSURE: 112 MMHG | TEMPERATURE: 98.1 F | BODY MASS INDEX: 27.85 KG/M2 | OXYGEN SATURATION: 97 % | DIASTOLIC BLOOD PRESSURE: 74 MMHG | HEART RATE: 90 BPM | HEIGHT: 69 IN

## 2017-05-26 DIAGNOSIS — G47.8 UPPER AIRWAY RESISTANCE SYNDROME: ICD-10-CM

## 2017-05-26 PROBLEM — G47.33 OBSTRUCTIVE SLEEP APNEA: Status: RESOLVED | Noted: 2017-04-03 | Resolved: 2017-05-26

## 2017-05-26 PROCEDURE — 99213 OFFICE O/P EST LOW 20 MIN: CPT | Performed by: NURSE PRACTITIONER

## 2017-05-26 NOTE — MR AVS SNAPSHOT
"        Desire Mancini   2017 11:20 AM   Sleep Center Visit   MRN: [id number]    Department:  [facility]   Dept Phone:  [phone]    Description:  Female : 1980   Provider:  ARIANNE Powers           Reason for Visit     Apnea Last seen 4/3/17    Results SS      Allergies as of 2017     No Known Allergies      Vital Signs     Blood Pressure Pulse Temperature Respirations Height Weight    112/74 mmHg 90 36.7 °C (98.1 °F) 16 1.753 m (5' 9.02\") 85.276 kg (188 lb)    Body Mass Index Oxygen Saturation Smoking Status             27.75 kg/m2 97% Passive Smoke Exposure - Never Smoker         Basic Information     Date Of Birth Sex Race Ethnicity Preferred Language    1980 Female White Non- English      Your appointments     2017  9:40 AM   Follow UP with ARIANNE Powers   [facility] (--)    [address]  [place] [address]   [phone]              Problem List              ICD-10-CM Priority Class Noted - Resolved    Obstructive sleep apnea G47.33   4/3/2017 - Present      Health Maintenance        Date Due Completion Dates    IMM DTaP/Tdap/Td Vaccine (1 - Tdap) 2018 (Originally 1999) ---    PAP SMEAR 3/1/2018 3/1/2015 (Done)    Override on 3/1/2015: Done (Dr. Dat Alas Gynocology)            Current Immunizations     No immunizations on file.      Below and/or attached are the medications your provider expects you to take. Review all of your home medications and newly ordered medications with your provider and/or pharmacist. Follow medication instructions as directed by your provider and/or pharmacist. Please keep your medication list with you and share with your provider. Update the information when medications are discontinued, doses are changed, or new medications (including over-the-counter products) are added; and carry medication information at all times in the event of emergency " situations     Allergies:  No Known Allergies          Medications  Valid as of: May 26, 2017 - 11:50 AM    Generic Name Brand Name Tablet Size Instructions for use    Dapsone (Gel) ACZONE 5 %         .                 Medicines prescribed today were sent to:     [facility] [place], [place] - [address]    [address] [place] [address]    Phone: [phone] Fax: [phone]    Open 24 Hours?: No      Medication refill instructions:       If your prescription bottle indicates you have medication refills left, it is not necessary to call your provider’s office. Please contact your pharmacy and they will refill your medication.    If your prescription bottle indicates you do not have any refills left, you may request refills at any time through one of the following ways: The online [employer] system (except Urgent Care), by calling your provider’s office, or by asking your pharmacy to contact your provider’s office with a refill request. Medication refills are processed only during regular business hours and may not be available until the next business day. Your provider may request additional information or to have a follow-up visit with you prior to refilling your medication.   *Please Note: Medication refills are assigned a new Rx number when refilled electronically. Your pharmacy may indicate that no refills were authorized even though a new prescription for the same medication is available at the pharmacy. Please request the medicine by name with the pharmacy before contacting your provider for a refill.        Instructions    1) Start autoCPAP at [id number]  2) Discussed acclimating to machine and change mask within first 30 days if required. Bring machine to first appointment.  3) Return in about 6 weeks (around 7/7/2017) for Compliance, review of symptoms, if not sooner, follow up with BEVERLY Ojeda.              MyChart Status: Patient Declined

## 2017-05-26 NOTE — PATIENT INSTRUCTIONS
1) Start autoCPAP at [id number]  2) Discussed acclimating to machine and change mask within first 30 days if required. Bring machine to first appointment.  3) Return in about 6 weeks (around 7/7/2017) for Compliance, review of symptoms, if not sooner, follow up with BEVERLY Ojeda.

## 2017-05-26 NOTE — PROGRESS NOTES
CC:  Here for f/u sleep issues as listed below    HPI:   Desire presents today for follow up upper airway resistance syndrome.  PSG from 5/2017 indicated a RDI of 9.1 and low oxygen of 91%.  Arousal index was 13.9. Treatment options were discussed with patient including CPAP treatment, dental appliance, UPPP surgery, and behavioral modifications. We discussed pathophysiology of Upper airway resistance syndrome. Patient is amendable to CPAP therapy. They understand they may need a future sleep study if treatment is ineffective.     Patient is currently sleeping 7 hours per night with 0-2 nighttime awakenings. They have no trouble typically falling asleep.   They do not feel refreshed in the morning and occasionally morning H/A. They feel tired throughout the day and does not nap.  Patient reports snoring, apnea events and paroxysmal nocturnal dyspnea events. They have never fallen asleep in conversation, at the wheel, or at work.  They deny sleepwalking/talking. Given her symptoms she would greatly benefit from the addition of the CPAP therapy.       Patient Active Problem List    Diagnosis Date Noted   • Upper airway resistance syndrome 05/26/2017       Past Medical History   Diagnosis Date   • Acne    • Chickenpox    • Influenza        History reviewed. No pertinent past surgical history.    Family History   Problem Relation Age of Onset   • Sleep Apnea Father    • Heart Disease Maternal Grandmother    • Cancer Paternal Grandmother    • Breast Cancer Paternal Grandmother    • Cancer Paternal Grandfather    • Lung Cancer Paternal Grandfather        Social History     Social History   • Marital Status:      Spouse Name: N/A   • Number of Children: N/A   • Years of Education: N/A     Occupational History   • Not on file.     Social History Main Topics   • Smoking status: Passive Smoke Exposure - Never Smoker   • Smokeless tobacco: Never Used   • Alcohol Use: 0.0 oz/week     0 Standard drinks or equivalent per  "week      Comment: occ   • Drug Use: No   • Sexual Activity: Not on file     Other Topics Concern   • Not on file     Social History Narrative       Current Outpatient Prescriptions   Medication Sig Dispense Refill   • ACZONE 5 % Gel        No current facility-administered medications for this visit.          Allergies: Review of patient's allergies indicates no known allergies.      ROS   Gen: Denies fever, chills, unintentional weight loss, fatigue  Resp:Denies Dyspnea  CV: Denies chest pain, chest tightness  Sleep:Denies morning headache,   Neuro: Denies frequent headaches, weakness, dizziness  See HPI.  All other systems reviewed and negative        Vital signs for this encounter:  Filed Vitals:    05/26/17 1118   Height: 1.753 m (5' 9.02\")   Weight: 85.276 kg (188 lb)   Weight % change since last entry.: 0 %   BP: 112/74   Pulse: 90   BMI (Calculated): 27.75   Resp: 16   Temp: 36.7 °C (98.1 °F)   O2 sat % room air: 97 %                   Physical Exam:   Gen:         Alert and oriented, No apparent distress.   Neck:        No Lymphadenopathy.  Lungs:     Clear to auscultation bilaterally.    CV:          Regular rate and rhythm. No murmurs, rubs or gallops.   Abd:         Soft non tender, non distended.            Ext:          No clubbing, cyanosis, edema.    Assessment   1. Upper airway resistance syndrome         PLAN:   Patient Instructions   1) Start autoCPAP at [id number]  2) Discussed acclimating to machine and change mask within first 30 days if required. Bring machine to first appointment.  3) Return in about 6 weeks (around 7/7/2017) for Compliance, review of symptoms, if not sooner, follow up with BEVERLY Ojeda.          "

## 2017-06-19 ENCOUNTER — OFFICE VISIT (OUTPATIENT)
Dept: URGENT CARE | Facility: CLINIC | Age: 37
End: 2017-06-19
Payer: COMMERCIAL

## 2017-06-19 ENCOUNTER — HOSPITAL ENCOUNTER (OUTPATIENT)
Facility: MEDICAL CENTER | Age: 37
End: 2017-06-19
Attending: PHYSICIAN ASSISTANT
Payer: COMMERCIAL

## 2017-06-19 VITALS
BODY MASS INDEX: 28.5 KG/M2 | SYSTOLIC BLOOD PRESSURE: 114 MMHG | DIASTOLIC BLOOD PRESSURE: 70 MMHG | WEIGHT: 192.4 LBS | TEMPERATURE: 98.2 F | HEIGHT: 69 IN | OXYGEN SATURATION: 100 % | HEART RATE: 77 BPM

## 2017-06-19 DIAGNOSIS — N30.01 ACUTE CYSTITIS WITH HEMATURIA: Primary | ICD-10-CM

## 2017-06-19 DIAGNOSIS — Z86.19 HISTORY OF CANDIDAL VULVOVAGINITIS: ICD-10-CM

## 2017-06-19 LAB
APPEARANCE UR: NORMAL
BILIRUB UR STRIP-MCNC: NEGATIVE MG/DL
COLOR UR AUTO: YELLOW
GLUCOSE UR STRIP.AUTO-MCNC: NEGATIVE MG/DL
KETONES UR STRIP.AUTO-MCNC: NEGATIVE MG/DL
LEUKOCYTE ESTERASE UR QL STRIP.AUTO: NORMAL
NITRITE UR QL STRIP.AUTO: NEGATIVE
PH UR STRIP.AUTO: 6.5 [PH] (ref 5–8)
PROT UR QL STRIP: 30 MG/DL
RBC UR QL AUTO: NORMAL
SP GR UR STRIP.AUTO: 1
UROBILINOGEN UR STRIP-MCNC: NEGATIVE MG/DL

## 2017-06-19 PROCEDURE — 87186 SC STD MICRODIL/AGAR DIL: CPT

## 2017-06-19 PROCEDURE — 99214 OFFICE O/P EST MOD 30 MIN: CPT | Performed by: PHYSICIAN ASSISTANT

## 2017-06-19 PROCEDURE — 81002 URINALYSIS NONAUTO W/O SCOPE: CPT | Performed by: PHYSICIAN ASSISTANT

## 2017-06-19 PROCEDURE — 87086 URINE CULTURE/COLONY COUNT: CPT

## 2017-06-19 PROCEDURE — 87077 CULTURE AEROBIC IDENTIFY: CPT

## 2017-06-19 PROCEDURE — 99000 SPECIMEN HANDLING OFFICE-LAB: CPT | Performed by: PHYSICIAN ASSISTANT

## 2017-06-19 RX ORDER — PHENAZOPYRIDINE HYDROCHLORIDE 200 MG/1
200 TABLET, FILM COATED ORAL 3 TIMES DAILY PRN
Qty: 6 TAB | Refills: 0 | Status: SHIPPED | OUTPATIENT
Start: 2017-06-19 | End: 2017-07-27

## 2017-06-19 RX ORDER — NITROFURANTOIN 25; 75 MG/1; MG/1
100 CAPSULE ORAL EVERY 12 HOURS
Qty: 10 CAP | Refills: 0 | Status: SHIPPED | OUTPATIENT
Start: 2017-06-19 | End: 2017-06-24

## 2017-06-19 RX ORDER — FLUCONAZOLE 150 MG/1
150 TABLET ORAL ONCE
Qty: 1 TAB | Refills: 0 | Status: SHIPPED | OUTPATIENT
Start: 2017-06-19 | End: 2017-06-19

## 2017-06-19 NOTE — MR AVS SNAPSHOT
"        Desire Mancini   2017 10:30 AM   Office Visit   MRN: [id number]    Department:  [facility]   Dept Phone:  [phone]    Description:  Female : 1980   Provider:  Jorge Miller PA-C           Reason for Visit     Urinary Frequency burns to pee xthis morning       Allergies as of 2017     No Known Allergies      You were diagnosed with     Acute cystitis with hematuria   [068252]  -  Primary     History of candidal vulvovaginitis   [717330]         Vital Signs     Blood Pressure Pulse Temperature Height Weight Body Mass Index    114/70 mmHg 77 36.8 °C (98.2 °F) 1.753 m (5' 9\") 87.272 kg (192 lb 6.4 oz) 28.40 kg/m2    Oxygen Saturation Smoking Status                100% Passive Smoke Exposure - Never Smoker          Basic Information     Date Of Birth Sex Race Ethnicity Preferred Language    1980 Female White Non- English      Your appointments     2017  9:00 AM   Follow UP with ARIANNE Powers   [facility] (--)    [address]  [place] [address]   [phone]              Problem List              ICD-10-CM Priority Class Noted - Resolved    Upper airway resistance syndrome G47.8   2017 - Present      Health Maintenance        Date Due Completion Dates    IMM DTaP/Tdap/Td Vaccine (1 - Tdap) 2018 (Originally 1999) ---    PAP SMEAR 3/1/2018 3/1/2015 (Done)    Override on 3/1/2015: Done (Dr. Dat Alas Gynocology)            Current Immunizations     No immunizations on file.      Below and/or attached are the medications your provider expects you to take. Review all of your home medications and newly ordered medications with your provider and/or pharmacist. Follow medication instructions as directed by your provider and/or pharmacist. Please keep your medication list with you and share with your provider. Update the information when medications are discontinued, doses are changed, or " new medications (including over-the-counter products) are added; and carry medication information at all times in the event of emergency situations     Allergies:  No Known Allergies          Medications  Valid as of: June 19, 2017 - 10:41 AM    Generic Name Brand Name Tablet Size Instructions for use    Dapsone (Gel) ACZONE 5 %         Fluconazole (Tab) DIFLUCAN 150 MG Take 1 Tab by mouth Once for 1 dose.        Nitrofurantoin Monohyd Macro (Cap) MACROBID 100 MG Take 1 Cap by mouth every 12 hours for 5 days.        Phenazopyridine HCl (Tab) PYRIDIUM 200 MG Take 1 Tab by mouth 3 times a day as needed.        .                 Medicines prescribed today were sent to:     [id number] - [place], [place] - [address]    [address] [place] [address]    Phone: [phone] Fax: [phone]    Open 24 Hours?: No      Medication refill instructions:       If your prescription bottle indicates you have medication refills left, it is not necessary to call your provider’s office. Please contact your pharmacy and they will refill your medication.    If your prescription bottle indicates you do not have any refills left, you may request refills at any time through one of the following ways: The online [employer] system (except Urgent Care), by calling your provider’s office, or by asking your pharmacy to contact your provider’s office with a refill request. Medication refills are processed only during regular business hours and may not be available until the next business day. Your provider may request additional information or to have a follow-up visit with you prior to refilling your medication.   *Please Note: Medication refills are assigned a new Rx number when refilled electronically. Your pharmacy may indicate that no refills were authorized even though a new prescription for the same medication is available at the pharmacy. Please request the medicine by name with the pharmacy before contacting your provider for a  refill.        Your To Do List     Future Labs/Procedures Complete By Expires    Urine Culture  As directed 6/19/2018      Instructions      Urinary Tract Infection  A urinary tract infection (UTI) can occur any place along the urinary tract. The tract includes the kidneys, ureters, bladder, and urethra. A type of germ called bacteria often causes a UTI. UTIs are often helped with antibiotic medicine.   HOME CARE   · If given, take antibiotics as told by your doctor. Finish them even if you start to feel better.  · Drink enough fluids to keep your pee (urine) clear or pale yellow.  · Avoid tea, drinks with caffeine, and bubbly (carbonated) drinks.  · Pee often. Avoid holding your pee in for a long time.  · Pee before and after having sex (intercourse).  · Wipe from front to back after you poop (bowel movement) if you are a woman. Use each tissue only once.  GET HELP RIGHT AWAY IF:   · You have back pain.  · You have lower belly (abdominal) pain.  · You have chills.  · You feel sick to your stomach (nauseous).  · You throw up (vomit).  · Your burning or discomfort with peeing does not go away.  · You have a fever.  · Your symptoms are not better in 3 days.  MAKE SURE YOU:   · Understand these instructions.  · Will watch your condition.  · Will get help right away if you are not doing well or get worse.     This information is not intended to replace advice given to you by your health care provider. Make sure you discuss any questions you have with your health care provider.     Document Released: 06/05/2009 Document Revised: 01/08/2016 Document Reviewed: 07/18/2013  [employer] Interactive Patient Education ©2016 [employer] Inc.            Aunt [employer] Access Code: Activation code not generated  Current Aunt Kitchen Status: Active

## 2017-06-19 NOTE — PATIENT INSTRUCTIONS
Urinary Tract Infection  A urinary tract infection (UTI) can occur any place along the urinary tract. The tract includes the kidneys, ureters, bladder, and urethra. A type of germ called bacteria often causes a UTI. UTIs are often helped with antibiotic medicine.   HOME CARE   · If given, take antibiotics as told by your doctor. Finish them even if you start to feel better.  · Drink enough fluids to keep your pee (urine) clear or pale yellow.  · Avoid tea, drinks with caffeine, and bubbly (carbonated) drinks.  · Pee often. Avoid holding your pee in for a long time.  · Pee before and after having sex (intercourse).  · Wipe from front to back after you poop (bowel movement) if you are a woman. Use each tissue only once.  GET HELP RIGHT AWAY IF:   · You have back pain.  · You have lower belly (abdominal) pain.  · You have chills.  · You feel sick to your stomach (nauseous).  · You throw up (vomit).  · Your burning or discomfort with peeing does not go away.  · You have a fever.  · Your symptoms are not better in 3 days.  MAKE SURE YOU:   · Understand these instructions.  · Will watch your condition.  · Will get help right away if you are not doing well or get worse.     This information is not intended to replace advice given to you by your health care provider. Make sure you discuss any questions you have with your health care provider.     Document Released: 06/05/2009 Document Revised: 01/08/2016 Document Reviewed: 07/18/2013  [employer] Interactive Patient Education ©2016 [employer]

## 2017-06-19 NOTE — PROGRESS NOTES
Subjective:      Pt is a 36 y.o. female who presents with Urinary Frequency            Urinary Frequency  This is a new problem. The current episode started today. The problem occurs constantly. The problem has been gradually worsening. Exacerbated by: urination. She has tried drinking for the symptoms. The treatment provided no relief.   PT comes into the UC with a chief complaint of dysuria, burning on urination, urgency, frequency, and bladder pressure and has NOT noticed blood in her urine x 1 day. PT denies fevers or chills, CP, SOB, NVD, paresthesias, headaches, dizziness, change in vision, hives, or joint pain. PT states the pain is a 7/10 with urination, aching in nature and worse this morning. She states she has not taken any meds for this issue. She denies flank or back pain as well. The pt's medication list, problem list, and allergies have been evaluated and reviewed during today's visit.        PMH:  Past Medical History   Diagnosis Date   • Acne    • Chickenpox    • Influenza        PSH:  Negative per pt.      Fam Hx:    family history includes Breast Cancer in her paternal grandmother; Cancer in her paternal grandfather and paternal grandmother; Heart Disease in her maternal grandmother; Lung Cancer in her paternal grandfather; Sleep Apnea in her father.  Family Status   Relation Status Death Age   • Mother Alive    • Father Alive    • Maternal Grandmother     • Maternal Grandfather     • Paternal Grandmother     • Paternal Grandfather         Soc HX:  Social History     Social History   • Marital Status:      Spouse Name: N/A   • Number of Children: N/A   • Years of Education: N/A     Occupational History   • Not on file.     Social History Main Topics   • Smoking status: Passive Smoke Exposure - Never Smoker   • Smokeless tobacco: Never Used   • Alcohol Use: 0.0 oz/week     0 Standard drinks or equivalent per week      Comment: occ   • Drug Use: No   • Sexual  "Activity: Not on file     Other Topics Concern   • Not on file     Social History Narrative         Medications:    Current outpatient prescriptions:   •  nitrofurantoin monohydr macro (MACROBID) 100 MG Cap, Take 1 Cap by mouth every 12 hours for 5 days., Disp: 10 Cap, Rfl: 0  •  ACZONE 5 % Gel, , Disp: , Rfl:       Allergies:  Review of patient's allergies indicates no known allergies.        ROS  Review of Systems   Constitutional: Negative for fever, chills and malaise/fatigue.   HENT: Negative for congestion and sore throat.    Eyes: Negative for blurred vision, double vision and photophobia.   Respiratory: Negative for cough and shortness of breath.    Cardiovascular: Negative for chest pain and palpitations.   Gastrointestinal: Negative for nausea, vomiting, abdominal pain, diarrhea and constipation.   Genitourinary: Positive for dysuria, urgency and frequency. Negative for hematuria and flank pain.   Musculoskeletal: Negative for joint pain and myalgias.   Skin: Negative for rash.   Neurological: Negative for dizziness, tingling and headaches.   Endo/Heme/Allergies: Does not bruise/bleed easily.   Psychiatric/Behavioral: Negative for depression. The patient is not nervous/anxious.           Objective:     /70 mmHg  Pulse 77  Temp(Src) 36.8 °C (98.2 °F)  Ht 1.753 m (5' 9\")  Wt 87.272 kg (192 lb 6.4 oz)  BMI 28.40 kg/m2  SpO2 100%     Physical Exam       Physical Exam   Constitutional: She is oriented to person, place, and time. She appears well-developed and well-nourished. No distress.   HENT:   Head: Normocephalic and atraumatic.   Right Ear: External ear normal.   Left Ear: External ear normal.   Nose: Nose normal.   Mouth/Throat: Oropharynx is clear and moist. No oropharyngeal exudate.   Eyes: Conjunctivae normal and EOM are normal. Pupils are equal, round, and reactive to light.   Neck: Normal range of motion. Neck supple. No thyromegaly present.   Cardiovascular: Normal rate, regular rhythm, " normal heart sounds and intact distal pulses.  Exam reveals no gallop and no friction rub.    No murmur heard.  Pulmonary/Chest: Effort normal and breath sounds normal. No respiratory distress. She has no wheezes. She has no rales. She exhibits no tenderness.   Abdominal: Soft. Bowel sounds are normal. She exhibits no distension and no mass. There is no tenderness. There is no rebound and no guarding.   Genitourinary:        Pt deferred   Musculoskeletal: Normal range of motion. She exhibits no edema and no tenderness.   Lymphadenopathy:     She has no cervical adenopathy.   Neurological: She is alert and oriented to person, place, and time. She has normal reflexes. No cranial nerve deficit.   Skin: Skin is warm and dry. No rash noted. No erythema.   Psychiatric: She has a normal mood and affect. Her behavior is normal. Judgment and thought content normal.        Assessment/Plan:     1. Acute cystitis with hematuria [N30.01]    - Urine Culture; Future  - POCT Urinalysis-->LEUKS AND BLOOD  - nitrofurantoin monohydr macro (MACROBID) 100 MG Cap; Take 1 Cap by mouth every 12 hours for 5 days.  Dispense: 10 Cap; Refill: 0    AZO discussed for dysuria  Probiotics discussed  Cranberry tablets discussed  Rest, fluids encouraged.  AVS with medical info given.  Pt was in full understanding and agreement with the plan.  Follow-up as needed if symptoms worsen or fail to improve.

## 2017-06-20 DIAGNOSIS — N30.01 ACUTE CYSTITIS WITH HEMATURIA: ICD-10-CM

## 2017-06-22 ENCOUNTER — TELEPHONE (OUTPATIENT)
Dept: URGENT CARE | Facility: CLINIC | Age: 37
End: 2017-06-22

## 2017-06-22 LAB
BACTERIA UR CULT: ABNORMAL
SIGNIFICANT IND 70042: ABNORMAL
SOURCE SOURCE: ABNORMAL

## 2017-06-22 NOTE — TELEPHONE ENCOUNTER
Called and left message with pt about urine culture results which came back positive for E.coli.   I told her she could continue the abx therapy with a positive urine culture which was sensitive to the abx she was placed on.  Encouraged Pt to call back with questions.  Jorge Miller PA-C

## 2017-07-27 ENCOUNTER — SLEEP CENTER VISIT (OUTPATIENT)
Dept: SLEEP MEDICINE | Facility: MEDICAL CENTER | Age: 37
End: 2017-07-27
Payer: COMMERCIAL

## 2017-07-27 VITALS
TEMPERATURE: 98.1 F | SYSTOLIC BLOOD PRESSURE: 110 MMHG | OXYGEN SATURATION: 99 % | DIASTOLIC BLOOD PRESSURE: 70 MMHG | BODY MASS INDEX: 28.88 KG/M2 | RESPIRATION RATE: 16 BRPM | HEART RATE: 78 BPM | WEIGHT: 195 LBS | HEIGHT: 69 IN

## 2017-07-27 DIAGNOSIS — G47.8 UPPER AIRWAY RESISTANCE SYNDROME: ICD-10-CM

## 2017-07-27 PROCEDURE — 99213 OFFICE O/P EST LOW 20 MIN: CPT | Performed by: NURSE PRACTITIONER

## 2017-07-27 RX ORDER — ZOLPIDEM TARTRATE 5 MG/1
TABLET ORAL
COMMUNITY
Start: 2017-05-18 | End: 2017-09-20

## 2017-07-27 NOTE — MR AVS SNAPSHOT
"        Desire Mancini   2017 9:00 AM   Sleep Center Visit   MRN: [id number]    Department:  [facility]   Dept Phone:  [phone]    Description:  Female : 1980   Provider:  ARIANNE Powers           Reason for Visit     Apnea Last seen 17       Allergies as of 2017     No Known Allergies      You were diagnosed with     AMELIA (obstructive sleep apnea)   [646458]         Vital Signs     Blood Pressure Pulse Temperature Respirations Height Weight    110/70 mmHg 78 36.7 °C (98.1 °F) 16 1.753 m (5' 9.02\") 88.451 kg (195 lb)    Body Mass Index Oxygen Saturation Smoking Status             28.78 kg/m2 99% Passive Smoke Exposure - Never Smoker         Basic Information     Date Of Birth Sex Race Ethnicity Preferred Language    1980 Female White Non- English      Your appointments     Oct 30, 2017  1:00 PM   Follow UP with ARIANNE Powers   [facility] (--)    [address] [place] [facility]  [place] [address]   [phone]              Problem List              ICD-10-CM Priority Class Noted - Resolved    Upper airway resistance syndrome G47.8   2017 - Present      Health Maintenance        Date Due Completion Dates    IMM INFLUENZA (1) 2018 (Originally 2017) ---    IMM DTaP/Tdap/Td Vaccine (1 - Tdap) 2018 (Originally 1999) ---    PAP SMEAR 3/1/2018 3/1/2015 (Done)    Override on 3/1/2015: Done (Dr. Dat Alas Gynocology)            Current Immunizations     No immunizations on file.      Below and/or attached are the medications your provider expects you to take. Review all of your home medications and newly ordered medications with your provider and/or pharmacist. Follow medication instructions as directed by your provider and/or pharmacist. Please keep your medication list with you and share with your provider. Update the information when medications are discontinued, doses are changed, or new " medications (including over-the-counter products) are added; and carry medication information at all times in the event of emergency situations     Allergies:  No Known Allergies          Medications  Valid as of: July 27, 2017 -  9:30 AM    Generic Name Brand Name Tablet Size Instructions for use    Dapsone (Gel) ACZONE 5 %         Zolpidem Tartrate (Tab) AMBIEN 5 MG         .                 Medicines prescribed today were sent to:     [facility] - [place], [place] - [address]    [address] [place] [address]    Phone: [phone] Fax: [phone]    Open 24 Hours?: No    [facility]    [address] [place] [address]    Phone: [phone] Fax: [phone]      Medication refill instructions:       If your prescription bottle indicates you have medication refills left, it is not necessary to call your provider’s office. Please contact your pharmacy and they will refill your medication.    If your prescription bottle indicates you do not have any refills left, you may request refills at any time through one of the following ways: The online [employer] system (except Urgent Care), by calling your provider’s office, or by asking your pharmacy to contact your provider’s office with a refill request. Medication refills are processed only during regular business hours and may not be available until the next business day. Your provider may request additional information or to have a follow-up visit with you prior to refilling your medication.   *Please Note: Medication refills are assigned a new Rx number when refilled electronically. Your pharmacy may indicate that no refills were authorized even though a new prescription for the same medication is available at the pharmacy. Please request the medicine by name with the pharmacy before contacting your provider for a refill.        Instructions    1) Continue autoCPAP at [id number]  2) Clean mask and supplies weekly and change them as insurance  allows  3) Return in about 3 months (around 10/27/2017) for Compliance, review of symptoms, if not sooner, follow up with BEVERLY Ojeda.              MyChart Access Code: Activation code not generated  Current BigFix Status: Active

## 2017-07-27 NOTE — PROGRESS NOTES
CC:  Here for f/u sleep issues as listed below    HPI:   Desire presents today for follow up upper airway resistance syndrome.  PSG from 5/2017 indicated a RDI of 9.1 and low oxygen of 91%.  Arousal index was 13.9. Currently she is being treated with autoCPAP @ [id number].  Compliance download from the dates 6/26/2017 - 7/25/2017 indicates she is wearing the device 63.3% for an avg of 5 hours and 47 minutes per night with a reduced AHI of 3.6. Average pressure is 7.1 with a high of 8.7.  She does tolerate pressure and mask well, but most recently started getting used to it.  She admits it was difficult starting the machine and gave herself breaks at that time. Over the past week she has been wearing it consistently with benefit. She wakes up slightly more refreshed and is less tired throughout the day. She does not nap. They deny morning H/A. If she does not wear the machine she does feel more fatigued and has morning H/A. She sleeps better overall. She will continue to clean supplies weekly and change them as insurance allows.       Patient Active Problem List    Diagnosis Date Noted   • Upper airway resistance syndrome 05/26/2017       Past Medical History   Diagnosis Date   • Acne    • Chickenpox    • Influenza        History reviewed. No pertinent past surgical history.    Family History   Problem Relation Age of Onset   • Sleep Apnea Father    • Heart Disease Maternal Grandmother    • Cancer Paternal Grandmother    • Breast Cancer Paternal Grandmother    • Cancer Paternal Grandfather    • Lung Cancer Paternal Grandfather        Social History     Social History   • Marital Status:      Spouse Name: N/A   • Number of Children: N/A   • Years of Education: N/A     Occupational History   • Not on file.     Social History Main Topics   • Smoking status: Passive Smoke Exposure - Never Smoker   • Smokeless tobacco: Never Used   • Alcohol Use: 0.0 oz/week     0 Standard drinks or equivalent per week       "Comment: occ   • Drug Use: No   • Sexual Activity: Not on file     Other Topics Concern   • Not on file     Social History Narrative       Current Outpatient Prescriptions   Medication Sig Dispense Refill   • ACZONE 5 % Gel      • zolpidem (AMBIEN) 5 MG Tab        No current facility-administered medications for this visit.          Allergies: Review of patient's allergies indicates no known allergies.      ROS   Gen: Denies fever, chills, unintentional weight loss, fatigue  Resp:Denies Dyspnea  CV: Denies chest pain, chest tightness  Sleep:Denies morning headache, insomnia, daytime somnolence, snoring, gasping for air, apnea  Neuro: Denies frequent headaches, weakness, dizziness  See HPI.  All other systems reviewed and negative        Vital signs for this encounter:  Filed Vitals:    07/27/17 0857   Height: 1.753 m (5' 9.02\")   Weight: 88.451 kg (195 lb)   Weight % change since last entry.: 0 %   BP: 110/70   Pulse: 78   BMI (Calculated): 28.78   Resp: 16   Temp: 36.7 °C (98.1 °F)   O2 sat % room air: 99 %                   Physical Exam:   Gen:         Alert and oriented, No apparent distress.   Neck:        No Lymphadenopathy.  Lungs:     Clear to auscultation bilaterally.    CV:          Regular rate and rhythm. No murmurs, rubs or gallops.   Abd:         Soft non tender, non distended.            Ext:          No clubbing, cyanosis, edema.    Assessment   1. Upper airway resistance syndrome         PLAN:   Patient Instructions   1) Continue autoCPAP at [id number]  2) Clean mask and supplies weekly and change them as insurance allows  3) Return in about 3 months (around 10/27/2017) for Compliance, review of symptoms, if not sooner, follow up with [facility] Ojeda.          "

## 2017-07-27 NOTE — PATIENT INSTRUCTIONS
1) Continue autoCPAP at [id number]  2) Clean mask and supplies weekly and change them as insurance allows  3) Return in about 3 months (around 10/27/2017) for Compliance, review of symptoms, if not sooner, follow up with [facility] Ojeda.

## 2017-08-15 ENCOUNTER — TELEPHONE (OUTPATIENT)
Dept: PULMONOLOGY | Facility: HOSPICE | Age: 37
End: 2017-08-15

## 2017-08-15 NOTE — TELEPHONE ENCOUNTER
Per last OV notes pt was to remain on the AUTOCPAP 5-10.  But ever since her OV a few weeks ago she is having a terrible time and it feels way stronger.  Twice she has woken up and the pressure was at 15.  She believes the pressures were somehow changed.  Going to see if Karen can reset them remotely or if she will need to go to [facility] to have the machine looked at.

## 2017-09-20 ENCOUNTER — OFFICE VISIT (OUTPATIENT)
Dept: INTERNAL MEDICINE | Facility: IMAGING CENTER | Age: 37
End: 2017-09-20
Payer: COMMERCIAL

## 2017-09-20 VITALS
DIASTOLIC BLOOD PRESSURE: 70 MMHG | RESPIRATION RATE: 14 BRPM | OXYGEN SATURATION: 100 % | HEIGHT: 69 IN | HEART RATE: 68 BPM | TEMPERATURE: 97.9 F | SYSTOLIC BLOOD PRESSURE: 120 MMHG | BODY MASS INDEX: 28.29 KG/M2 | WEIGHT: 191 LBS

## 2017-09-20 DIAGNOSIS — G47.8 UPPER AIRWAY RESISTANCE SYNDROME: Chronic | ICD-10-CM

## 2017-09-20 DIAGNOSIS — L70.0 ACNE VULGARIS: Chronic | ICD-10-CM

## 2017-09-20 DIAGNOSIS — Z87.440 HISTORY OF RECURRENT UTI (URINARY TRACT INFECTION): Chronic | ICD-10-CM

## 2017-09-20 DIAGNOSIS — Z99.89 CPAP (CONTINUOUS POSITIVE AIRWAY PRESSURE) DEPENDENCE: ICD-10-CM

## 2017-09-20 DIAGNOSIS — Z23 NEEDS FLU SHOT: ICD-10-CM

## 2017-09-20 DIAGNOSIS — Z00.00 ENCOUNTER FOR WELLNESS EXAMINATION IN ADULT: ICD-10-CM

## 2017-09-20 LAB
APPEARANCE UR: CLEAR
BILIRUB UR STRIP-MCNC: NEGATIVE MG/DL
COLOR UR AUTO: YELLOW
GLUCOSE UR STRIP.AUTO-MCNC: NEGATIVE MG/DL
KETONES UR STRIP.AUTO-MCNC: NORMAL MG/DL
LEUKOCYTE ESTERASE UR QL STRIP.AUTO: NEGATIVE
NITRITE UR QL STRIP.AUTO: NEGATIVE
PH UR STRIP.AUTO: 7 [PH] (ref 5–8)
PROT UR QL STRIP: NEGATIVE MG/DL
RBC UR QL AUTO: NEGATIVE
SP GR UR STRIP.AUTO: 1.01
UROBILINOGEN UR STRIP-MCNC: NEGATIVE MG/DL

## 2017-09-20 PROCEDURE — 90471 IMMUNIZATION ADMIN: CPT | Performed by: FAMILY MEDICINE

## 2017-09-20 PROCEDURE — 99395 PREV VISIT EST AGE 18-39: CPT | Mod: 25 | Performed by: FAMILY MEDICINE

## 2017-09-20 PROCEDURE — 90686 IIV4 VACC NO PRSV 0.5 ML IM: CPT | Performed by: FAMILY MEDICINE

## 2017-09-20 PROCEDURE — 81002 URINALYSIS NONAUTO W/O SCOPE: CPT | Performed by: FAMILY MEDICINE

## 2017-09-21 NOTE — PROGRESS NOTES
"CC: discuss health history    HPI:  Patient is a 37 y.o. female patient who presents today to establish care at our office. She is  with a three year old son named Lincoln and considers herself quite healthy. She has a history of recurrent UTIs and would like a UA today due to perceived isolated urinary urgency recently. She also has chronic upper airway resistance with qhs CPAP use managed by [facility]. She reports 100% compliance with CPAP and finds that it is helpful with prior fatigue complaints. Her last labs were done April 2016 and were reviewed with her today. She sees Dr. Whittington her her GYN needs and last pap smear was done 3/17. She has had colposcopies in the past that were ultimately negative. She is also interested in obtaining her flu shot today.     Patient Active Problem List    Diagnosis Date Noted   • History of recurrent UTI (urinary tract infection) 09/20/2017   • CPAP (continuous positive airway pressure) dependence 09/20/2017   • Upper airway resistance syndrome 05/26/2017       Past medical, surgical, family, and social history was reviewed and updated in Epic chart by me today.     Medications and allergies reviewed and updated in Epic chart by me today.     ROS:  Pertinent positives listed above in HPI. All other systems have been reviewed and are negative.    PE:   /70   Pulse 68   Temp 36.6 °C (97.9 °F)   Resp 14   Ht 1.74 m (5' 8.5\")   Wt 86.6 kg (191 lb)   LMP 09/09/2017   SpO2 100%   Breastfeeding? No   BMI 28.62 kg/m²   Vital signs reviewed with patient.     Gen: Well developed; well nourished; no acute distress; age appropriate appearance   HEENT: Normocephalic; atraumatic; PEERLA b/l; sclera clear b/l; b/l external auditory canals WNL; b/l TM WNL; oropharynx clear; oral mucosa moist; tongue midline; dentition adequate   Neck: No adenopathy; no thyromegaly  CV: Regular rate and rhythm; S1/ S2 present; no murmur, gallop or rub noted  Pulm: No respiratory " distress; clear to ascultation b/l; no wheezing or stridor noted b/l  Abd: Adequate bowel sounds noted; soft and nontender; no rebound, rigidity, nor distention  Extremities: No peripheral edema b/l LE extremities/ no clubbing nor cyanosis noted  Skin: Warm and dry; no rashes noted   Neuro: No focal deficits noted   Psych: AAOx4; mood and affect are appropriate/very friendly     A/P:  1. Chronic upper airway resistance syndrome  Improved with qhs CPAP use    2. History of recurrent UTI (urinary tract infection)  POC UA WNL at clinic today.  - POCT Urinalysis    3. Needs flu shot  Flu vaccine administered at clinic today.  - Flu Quad Inj >3 Year Pre-Filled PF    4. CPAP (continuous positive airway pressure) dependence  Pt reports 100% compliance/ managed by [facility].    5. Encounter for wellness exam in adult  Pt is doing well overall and recommend fasting labs to be done in 2018.     I would like to see her annually/PRN sooner if current condition changes.

## 2017-11-10 ENCOUNTER — OFFICE VISIT (OUTPATIENT)
Dept: INTERNAL MEDICINE | Facility: IMAGING CENTER | Age: 37
End: 2017-11-10
Payer: COMMERCIAL

## 2017-11-10 VITALS
HEART RATE: 69 BPM | TEMPERATURE: 98.4 F | WEIGHT: 192 LBS | RESPIRATION RATE: 14 BRPM | DIASTOLIC BLOOD PRESSURE: 60 MMHG | SYSTOLIC BLOOD PRESSURE: 114 MMHG | BODY MASS INDEX: 28.44 KG/M2 | HEIGHT: 69 IN | OXYGEN SATURATION: 97 %

## 2017-11-10 DIAGNOSIS — S79.911A: ICD-10-CM

## 2017-11-10 DIAGNOSIS — F41.1 GAD (GENERALIZED ANXIETY DISORDER): Chronic | ICD-10-CM

## 2017-11-10 PROCEDURE — 99214 OFFICE O/P EST MOD 30 MIN: CPT | Performed by: FAMILY MEDICINE

## 2017-11-10 RX ORDER — ALPRAZOLAM 0.25 MG/1
0.25 TABLET ORAL 2 TIMES DAILY PRN
Qty: 10 TAB | Refills: 1 | Status: SHIPPED
Start: 2017-11-10 | End: 2019-08-19 | Stop reason: SDUPTHER

## 2017-11-10 NOTE — PROGRESS NOTES
"Chief Complaint   Patient presents with   • Back Pain       HPI:  Patient is a 37 y.o. female established patient who presents today for evaluation of new R sided back pain present for one month after doing a kettle bell workout. She reports that initially her pain radiated down the back of her right leg to level of her knee and then resolved with ice/heat/stretching/inversion table work/ rest. She continues to have residual/constant dull pain (\"feels bruised\") over R SI joint area, exacerbated with certain workout moves/ leg presses/ prolonged sitting but no longer has radicular symptoms down her leg. She also has chronic TRACEY and uses xanax infrequently to cope - requesting small refill for PRN use. She is here with her adorable son Lincoln today and is in good spirits.     Patient Active Problem List    Diagnosis Date Noted   • TRACEY (generalized anxiety disorder) 11/10/2017   • History of recurrent UTI (urinary tract infection) 09/20/2017   • CPAP (continuous positive airway pressure) dependence 09/20/2017   • Acne vulgaris 09/20/2017   • Upper airway resistance syndrome 05/26/2017     Past medical, surgical, family, and social history was reviewed in Epic chart by me today.     Medications and allergies reviewed and updated in Epic chart by me today.     ROS:  Pertinent positives listed above in HPI. All other systems have been reviewed and are negative.    PE:   /60   Pulse 69   Temp 36.9 °C (98.4 °F)   Resp 14   Ht 1.74 m (5' 8.5\")   Wt 87.1 kg (192 lb)   LMP 11/01/2017   SpO2 97%   Breastfeeding? No   BMI 28.77 kg/m²   Vital signs reviewed with patient.     Gen: Well developed; well nourished; no acute distress; age appropriate appearance   Back: no midline tenderness noted/ pain with palpation over R SI joint/surrounding area - exacerbated with flexion and extension from hips/ cannot reproduce radiculopathy  Skin: Warm and dry; no rashes noted   Neuro: No focal deficits noted; normal gait  Psych: " AAOx4; mood and affect are appropriate    A/P:  1. Chronic TRACEY (generalized anxiety disorder)  Stable/ well controlled with very infrequent xanax use (home tablets have )/ refill sent to pharmacy.   - alprazolam (XANAX) 0.25 MG Tab; Take 1 Tab by mouth 2 times a day as needed for Sleep or Anxiety.  Dispense: 10 Tab; Refill: 1    2. Injury to joint of right side of pelvis, initial encounter  Pt would benefit from PT - [facility] sent to [facility] per her request. She is to continue to ice area after exercise/ avoid all exercise and activity that cause area pain.   - REFERRAL TO PHYSICAL THERAPY Reason for Therapy: Eval/Treat/Report      Pt is to call our office if current conditions change/ do not improve.

## 2017-12-29 RX ORDER — AMMONIUM LACTATE 12 G/100G
LOTION TOPICAL
Qty: 1 BOTTLE | Refills: 2 | Status: SHIPPED | OUTPATIENT
Start: 2017-12-29 | End: 2018-09-19 | Stop reason: SDUPTHER

## 2018-01-11 ENCOUNTER — SLEEP CENTER VISIT (OUTPATIENT)
Dept: SLEEP MEDICINE | Facility: MEDICAL CENTER | Age: 38
End: 2018-01-11
Payer: COMMERCIAL

## 2018-01-11 VITALS
HEIGHT: 69 IN | RESPIRATION RATE: 16 BRPM | BODY MASS INDEX: 27.85 KG/M2 | HEART RATE: 72 BPM | SYSTOLIC BLOOD PRESSURE: 112 MMHG | WEIGHT: 188 LBS | OXYGEN SATURATION: 94 % | TEMPERATURE: 97.5 F | DIASTOLIC BLOOD PRESSURE: 62 MMHG

## 2018-01-11 DIAGNOSIS — G47.8 UPPER AIRWAY RESISTANCE SYNDROME: Chronic | ICD-10-CM

## 2018-01-11 PROBLEM — G47.33 OSA (OBSTRUCTIVE SLEEP APNEA): Status: RESOLVED | Noted: 2017-04-03 | Resolved: 2018-01-11

## 2018-01-11 PROBLEM — Z99.89 CPAP (CONTINUOUS POSITIVE AIRWAY PRESSURE) DEPENDENCE: Chronic | Status: RESOLVED | Noted: 2017-09-20 | Resolved: 2018-01-11

## 2018-01-11 PROCEDURE — 99213 OFFICE O/P EST LOW 20 MIN: CPT | Performed by: NURSE PRACTITIONER

## 2018-01-11 NOTE — PATIENT INSTRUCTIONS
1) Continue autoCPAP at [id number]  2) Clean mask and supplies weekly and change them as insurance allows  3) Vaccines: Up to date with flu  4) Return in about 6 months (around 7/11/2018) for Compliance, review of symptoms, if not sooner, follow up with [facility] Ojeda.

## 2018-01-11 NOTE — PROGRESS NOTES
CC:  Here for f/u sleep issues as listed below    HPI:   Desire presents today for follow up upper airway resistance syndrome.  PSG from 5/2017 indicated a RDI of 9.1 and low oxygen of 91%.  Arousal index was 13.9. Currently she is being treated with autoCPAP @ [id number].    Compliance download from the dates 12/12/2017 - 1/10/2018 indicates she is wearing the device 96.7% for an avg of 6 hours and 51 minutes per night with a reduced AHI of 2.0. Average pressure is 6.2 with a high of 8.7.    She does tolerate pressure and mask well. She admits she has periods where she is intolerant to the machine for a week or so and other times no trouble wearing it. There have been a few times the mask is off when she wakes. She tried a chin strap without benefit. Dry mouth is tolerable she reports. She wakes up more refreshed and is less tired throughout the day. She does not nap. They deny morning H/A. If she does not wear the machine she does feel more fatigued and has morning H/A. She sleeps better overall. She will continue to clean supplies weekly and change them as insurance allows.       Patient Active Problem List    Diagnosis Date Noted   • TRACEY (generalized anxiety disorder) 11/10/2017   • History of recurrent UTI (urinary tract infection) 09/20/2017   • Acne vulgaris 09/20/2017   • Upper airway resistance syndrome 05/26/2017       Past Medical History:   Diagnosis Date   • Acne    • Chickenpox    • History of recurrent UTIs    • Influenza    • Upper airway resistance syndrome        History reviewed. No pertinent surgical history.    Family History   Problem Relation Age of Onset   • Hypertension Mother    • Sleep Apnea Father    • Heart Disease Maternal Grandmother    • Cancer Paternal Grandmother    • Breast Cancer Paternal Grandmother    • Cancer Paternal Grandfather    • Lung Cancer Paternal Grandfather        Social History     Social History   • Marital status:      Spouse name: N/A   • Number of  "children: N/A   • Years of education: N/A     Occupational History   • Not on file.     Social History Main Topics   • Smoking status: Passive Smoke Exposure - Never Smoker   • Smokeless tobacco: Never Used   • Alcohol use 4.2 oz/week     7 Glasses of wine per week      Comment: occ   • Drug use: No   • Sexual activity: Yes     Partners: Male     Birth control/ protection: Surgical      Comment:  had vasectomy     Other Topics Concern   • Not on file     Social History Narrative   • No narrative on file       Current Outpatient Prescriptions   Medication Sig Dispense Refill   • ammonium lactate (LAC-HYDRIN) 12 % Lotion Apply to feet BID prn 1 Bottle 2   • CRANBERRY CONCENTRATE PO Take 1 Cap by mouth every day.     • ACZONE 5 % Gel      • alprazolam (XANAX) 0.25 MG Tab Take 1 Tab by mouth 2 times a day as needed for Sleep or Anxiety. 10 Tab 1     No current facility-administered medications for this visit.           Allergies: Patient has no known allergies.      ROS   Gen: Denies fever, chills, unintentional weight loss, fatigue  Resp:Denies Dyspnea  CV: Denies chest pain, chest tightness  Sleep:Denies morning headache, insomnia, daytime somnolence, snoring, gasping for air, apnea  Neuro: Denies frequent headaches, weakness, dizziness  See HPI.  All other systems reviewed and negative        Vital signs for this encounter:  Vitals:    01/11/18 1044   Height: 1.74 m (5' 8.5\")   Weight: 85.3 kg (188 lb)   Weight % change since last entry.: 0 %   BP: 112/62   Pulse: 72   BMI (Calculated): 28.17   Resp: 16   Temp: 36.4 °C (97.5 °F)   O2 sat % room air: 94 %                   Physical Exam:   Gen:         Alert and oriented, No apparent distress.   Neck:        No Lymphadenopathy.  Lungs:     Clear to auscultation bilaterally.    CV:          Regular rate and rhythm. No murmurs, rubs or gallops.   Abd:         Soft non tender, non distended.            Ext:          No clubbing, cyanosis, edema.    Assessment   1. " Upper airway resistance syndrome         PLAN:   Patient Instructions   1) Continue autoCPAP at [id number]  2) Clean mask and supplies weekly and change them as insurance allows  3) Vaccines: Up to date with   5) Return in about 6 months (around 7/11/2018) for Compliance, review of symptoms, if not sooner, follow up with [facility] O[facility].

## 2018-01-11 NOTE — PROGRESS NOTES
CC:  Here for f/u sleep issues as listed below    HPI:   Desire presents today for follow up upper airway resistance syndrome.  PSG from 5/2017 indicated a RDI of 9.1 and low oxygen of 91%.  Arousal index was 13.9. Currently she is being treated with autoCPAP @ [id number].  Compliance download from the dates 6/26/2017 - 7/25/2017 indicates she is wearing the device 63.3% for an avg of 5 hours and 47 minutes per night with a reduced AHI of 3.6. Average pressure is 7.1 with a high of 8.7.  She does tolerate pressure and mask well, but most recently started getting used to it.  She admits it was difficult starting the machine and gave herself breaks at that time. Over the past week she has been wearing it consistently with benefit. She wakes up slightly more refreshed and is less tired throughout the day. She does not nap. They deny morning H/A. If she does not wear the machine she does feel more fatigued and has morning H/A. She sleeps better overall. She will continue to clean supplies weekly and change them as insurance allows.       Desire presents today for follow up {SLEEP APNEA DIAGNOSIS:67614}.  PSG from *** indicated an AHI of *** and low oxygenation of ***%.  Currently she is being treated with *** @ ***cmH20.  Compliance download from the dates ***/***/2017 - ***/***/2017 indicates she is wearing the device ***% for an avg of *** hours and *** minutes per night with a reduced AHI of ***.  She {DOES/DOES NOT:49461} tolerate pressure and mask well.  She wakes up refreshed and is less tired throughout the day. They deny morning H/A. She sleeps better overall. She will continue to clean supplies weekly and change them as insurance allows. ***       Patient Active Problem List    Diagnosis Date Noted   • TRACEY (generalized anxiety disorder) 11/10/2017   • History of recurrent UTI (urinary tract infection) 09/20/2017   • CPAP (continuous positive airway pressure) dependence 09/20/2017   • Acne vulgaris 09/20/2017    • Upper airway resistance syndrome 05/26/2017       Past Medical History:   Diagnosis Date   • Acne    • Chickenpox    • History of recurrent UTIs    • Influenza    • Upper airway resistance syndrome        History reviewed. No pertinent surgical history.    Family History   Problem Relation Age of Onset   • Hypertension Mother    • Sleep Apnea Father    • Heart Disease Maternal Grandmother    • Cancer Paternal Grandmother    • Breast Cancer Paternal Grandmother    • Cancer Paternal Grandfather    • Lung Cancer Paternal Grandfather        Social History     Social History   • Marital status:      Spouse name: N/A   • Number of children: N/A   • Years of education: N/A     Occupational History   • Not on file.     Social History Main Topics   • Smoking status: Passive Smoke Exposure - Never Smoker   • Smokeless tobacco: Never Used   • Alcohol use 4.2 oz/week     7 Glasses of wine per week      Comment: occ   • Drug use: No   • Sexual activity: Yes     Partners: Male     Birth control/ protection: Surgical      Comment:  had vasectomy     Other Topics Concern   • Not on file     Social History Narrative   • No narrative on file       Current Outpatient Prescriptions   Medication Sig Dispense Refill   • ammonium lactate (LAC-HYDRIN) 12 % Lotion Apply to feet BID prn 1 Bottle 2   • CRANBERRY CONCENTRATE PO Take 1 Cap by mouth every day.     • ACZONE 5 % Gel      • alprazolam (XANAX) 0.25 MG Tab Take 1 Tab by mouth 2 times a day as needed for Sleep or Anxiety. 10 Tab 1     No current facility-administered medications for this visit.           Allergies: Patient has no known allergies.      ROS ***  Gen: Denies fever, chills, unintentional weight loss, fatigue  Resp:Denies Dyspnea  CV: Denies chest pain, chest tightness  Sleep:Denies morning headache, insomnia, daytime somnolence, snoring, gasping for air, apnea  Neuro: Denies frequent headaches, weakness, dizziness  See HPI.  All other systems reviewed  "and negative        Vital signs for this encounter:  Vitals:    01/11/18 1044   Height: 1.74 m (5' 8.5\")   Weight: 85.3 kg (188 lb)   Weight % change since last entry.: 0 %   BP: 112/62   Pulse: 72   BMI (Calculated): 28.17   Resp: 16   Temp: 36.4 °C (97.5 °F)   O2 sat % room air: 94 %                   Physical Exam: ***  Gen:         Alert and oriented, No apparent distress.   Neck:        No Lymphadenopathy.  Lungs:     Clear to auscultation bilaterally.  ***  CV:          Regular rate and rhythm. No murmurs, rubs or gallops. ***  Abd:         Soft non tender, non distended.            Ext:          No clubbing, cyanosis, edema.    Assessment   No diagnosis found.    PLAN:   ***  "

## 2018-04-25 ENCOUNTER — HOSPITAL ENCOUNTER (OUTPATIENT)
Dept: RADIOLOGY | Facility: MEDICAL CENTER | Age: 38
End: 2018-04-25
Attending: PHYSICAL MEDICINE & REHABILITATION
Payer: COMMERCIAL

## 2018-04-25 DIAGNOSIS — M54.17 LUMBOSACRAL RADICULOPATHY: ICD-10-CM

## 2018-04-25 DIAGNOSIS — M54.5 LOW BACK PAIN, UNSPECIFIED BACK PAIN LATERALITY, UNSPECIFIED CHRONICITY, WITH SCIATICA PRESENCE UNSPECIFIED: ICD-10-CM

## 2018-04-25 PROCEDURE — 72148 MRI LUMBAR SPINE W/O DYE: CPT

## 2018-07-12 ENCOUNTER — SLEEP CENTER VISIT (OUTPATIENT)
Dept: SLEEP MEDICINE | Facility: MEDICAL CENTER | Age: 38
End: 2018-07-12
Payer: COMMERCIAL

## 2018-07-12 VITALS
BODY MASS INDEX: 28.73 KG/M2 | RESPIRATION RATE: 16 BRPM | WEIGHT: 194 LBS | SYSTOLIC BLOOD PRESSURE: 114 MMHG | TEMPERATURE: 97.9 F | OXYGEN SATURATION: 97 % | HEIGHT: 69 IN | DIASTOLIC BLOOD PRESSURE: 60 MMHG | HEART RATE: 90 BPM

## 2018-07-12 DIAGNOSIS — G47.33 OSA (OBSTRUCTIVE SLEEP APNEA): ICD-10-CM

## 2018-07-12 DIAGNOSIS — G47.8 UPPER AIRWAY RESISTANCE SYNDROME: Chronic | ICD-10-CM

## 2018-07-12 PROCEDURE — 99213 OFFICE O/P EST LOW 20 MIN: CPT | Performed by: NURSE PRACTITIONER

## 2018-07-12 RX ORDER — NORETHINDRONE ACETATE AND ETHINYL ESTRADIOL, ETHINYL ESTRADIOL AND FERROUS FUMARATE 1MG-10(24)
KIT ORAL
COMMUNITY
Start: 2018-06-19 | End: 2020-09-03

## 2018-07-12 NOTE — PATIENT INSTRUCTIONS
1) Continue autoCPAP and change pressure 5-9cmH20  2) Clean mask and supplies weekly and change them as insurance allows  3) Vaccines: Flu Recommended   4) Return in about 2 months (around 9/12/2018) for follow up with [facility] Oj[facility], if not sooner, review of symptoms, Compliance.

## 2018-07-12 NOTE — PROGRESS NOTES
CC:  Here for f/u sleep issues as listed below    HPI:   Desire presents today for follow up upper airway resistance syndrome.  PSG from 5/2017 indicated a RDI of 9.1 and low oxygen of 91%.  Arousal index was 13.9. Currently she is being treated with autoCPAP @ 5-16ngH47.    Compliance download from the dates 6/12/2018 - 7/11/2018 indicates she is wearing the device 23.3% for an avg of 5 hours and 1 minutes per night with a reduced AHI of 2.1.  Average pressure is 6.3 with a high of 7.9.  She has been more intolerant of the machine.  She is unsure if the pressure and mask are bothering her, but occasionally the pressure is too much in the middle of the night.   She admits she has periods where she is intolerant to the machine for a week or so and other times no trouble wearing it. There have been a few times the mask is off when she wakes. She tried a chin strap without benefit. Dry mouth is tolerable, but will wake up drooling. She may try paper tape. She wakes up more refreshed and is less tired throughout the day. She does not nap. They deny morning H/A. If she does not wear the machine she does feel more fatigued and has morning H/A. She sleeps better overall. She will continue to clean supplies weekly and change them as insurance allows.                Patient Active Problem List    Diagnosis Date Noted   • TRACEY (generalized anxiety disorder) 11/10/2017   • History of recurrent UTI (urinary tract infection) 09/20/2017   • Acne vulgaris 09/20/2017   • Upper airway resistance syndrome 05/26/2017   • AMELIA (obstructive sleep apnea) 04/03/2017       Past Medical History:   Diagnosis Date   • Acne    • Chickenpox    • History of recurrent UTIs    • Influenza    • Upper airway resistance syndrome        History reviewed. No pertinent surgical history.    Family History   Problem Relation Age of Onset   • Hypertension Mother    • Sleep Apnea Father    • Heart Disease Maternal Grandmother    • Cancer Paternal Grandmother  "   • Breast Cancer Paternal Grandmother    • Cancer Paternal Grandfather    • Lung Cancer Paternal Grandfather        Social History     Social History   • Marital status:      Spouse name: N/A   • Number of children: N/A   • Years of education: N/A     Occupational History   • Not on file.     Social History Main Topics   • Smoking status: Passive Smoke Exposure - Never Smoker   • Smokeless tobacco: Never Used   • Alcohol use 4.2 oz/week     7 Glasses of wine per week      Comment: occ   • Drug use: No   • Sexual activity: Yes     Partners: Male     Birth control/ protection: Surgical      Comment:  had vasectomy     Other Topics Concern   • Not on file     Social History Narrative   • No narrative on file       Current Outpatient Prescriptions   Medication Sig Dispense Refill   • LO LOESTRIN FE 1 MG-10 MCG / 10 MCG Tab      • ammonium lactate (LAC-HYDRIN) 12 % Lotion Apply to feet BID prn 1 Bottle 2   • alprazolam (XANAX) 0.25 MG Tab Take 1 Tab by mouth 2 times a day as needed for Sleep or Anxiety. 10 Tab 1   • CRANBERRY CONCENTRATE PO Take 1 Cap by mouth every day.     • ACZONE 5 % Gel        No current facility-administered medications for this visit.           Allergies: Patient has no known allergies.      ROS   Gen: Denies fever, chills, unintentional weight loss, fatigue  Resp:Denies Dyspnea  CV: Denies chest pain, chest tightness  Sleep:Denies morning headache, insomnia, daytime somnolence, snoring, gasping for air, apnea  Neuro: Denies frequent headaches, weakness, dizziness  See HPI.  All other systems reviewed and negative        Vital signs for this encounter:  Vitals:    07/12/18 1042   Height: 1.74 m (5' 8.5\")   Weight: 88 kg (194 lb)   Weight % change since last entry.: 0 %   BP: 114/60   Pulse: 90   BMI (Calculated): 29.07   Resp: 16   Temp: 36.6 °C (97.9 °F)   O2 sat % room air: 97 %                   Physical Exam:   Gen:         Alert and oriented, No apparent distress.   Neck:      "   No Lymphadenopathy.  Lungs:     Clear to auscultation bilaterally.    CV:          Regular rate and rhythm. No murmurs, rubs or gallops.   Abd:         Soft non tender, non distended.            Ext:          No clubbing, cyanosis, edema.    Assessment   1. AMELIA (obstructive sleep apnea)  DME OTHER   2. Upper airway resistance syndrome         Patient is clinically stable and will proceed with following plan.     PLAN:   Patient Instructions   1) Continue autoCPAP and change pressure 5-9cmH20.   2) Clean mask and supplies weekly and change them as insurance allows  3) Vaccines: Flu Recommended   4) Return in about 2 months (around 9/12/2018) for follow up with BEVERLY Ojeda, if not sooner, review of symptoms, Compliance.

## 2018-08-14 DIAGNOSIS — Z00.00 HEALTH CARE MAINTENANCE: ICD-10-CM

## 2018-08-16 ENCOUNTER — NON-PROVIDER VISIT (OUTPATIENT)
Dept: INTERNAL MEDICINE | Facility: IMAGING CENTER | Age: 38
End: 2018-08-16
Payer: COMMERCIAL

## 2018-08-16 ENCOUNTER — HOSPITAL ENCOUNTER (OUTPATIENT)
Facility: MEDICAL CENTER | Age: 38
End: 2018-08-16
Attending: FAMILY MEDICINE
Payer: COMMERCIAL

## 2018-08-16 DIAGNOSIS — Z00.00 HEALTH CARE MAINTENANCE: ICD-10-CM

## 2018-08-16 DIAGNOSIS — Z01.89 ENCOUNTER FOR ROUTINE LABORATORY TESTING: ICD-10-CM

## 2018-08-16 LAB
25(OH)D3 SERPL-MCNC: 39 NG/ML (ref 30–100)
ALBUMIN SERPL BCP-MCNC: 4.3 G/DL (ref 3.2–4.9)
ALBUMIN/GLOB SERPL: 1.3 G/DL
ALP SERPL-CCNC: 46 U/L (ref 30–99)
ALT SERPL-CCNC: 20 U/L (ref 2–50)
ANION GAP SERPL CALC-SCNC: 10 MMOL/L (ref 0–11.9)
AST SERPL-CCNC: 30 U/L (ref 12–45)
BASOPHILS # BLD AUTO: 0.3 % (ref 0–1.8)
BASOPHILS # BLD: 0.02 K/UL (ref 0–0.12)
BILIRUB SERPL-MCNC: 0.6 MG/DL (ref 0.1–1.5)
BUN SERPL-MCNC: 12 MG/DL (ref 8–22)
CALCIUM SERPL-MCNC: 9.7 MG/DL (ref 8.5–10.5)
CHLORIDE SERPL-SCNC: 104 MMOL/L (ref 96–112)
CHOLEST SERPL-MCNC: 207 MG/DL (ref 100–199)
CO2 SERPL-SCNC: 25 MMOL/L (ref 20–33)
CREAT SERPL-MCNC: 0.82 MG/DL (ref 0.5–1.4)
EOSINOPHIL # BLD AUTO: 0.06 K/UL (ref 0–0.51)
EOSINOPHIL NFR BLD: 1 % (ref 0–6.9)
ERYTHROCYTE [DISTWIDTH] IN BLOOD BY AUTOMATED COUNT: 42.5 FL (ref 35.9–50)
GLOBULIN SER CALC-MCNC: 3.4 G/DL (ref 1.9–3.5)
GLUCOSE SERPL-MCNC: 81 MG/DL (ref 65–99)
HCT VFR BLD AUTO: 46.6 % (ref 37–47)
HDLC SERPL-MCNC: 60 MG/DL
HGB BLD-MCNC: 15.8 G/DL (ref 12–16)
IMM GRANULOCYTES # BLD AUTO: 0.01 K/UL (ref 0–0.11)
IMM GRANULOCYTES NFR BLD AUTO: 0.2 % (ref 0–0.9)
LDLC SERPL CALC-MCNC: 126 MG/DL
LYMPHOCYTES # BLD AUTO: 1.85 K/UL (ref 1–4.8)
LYMPHOCYTES NFR BLD: 30.8 % (ref 22–41)
MCH RBC QN AUTO: 32.2 PG (ref 27–33)
MCHC RBC AUTO-ENTMCNC: 33.9 G/DL (ref 33.6–35)
MCV RBC AUTO: 94.9 FL (ref 81.4–97.8)
MONOCYTES # BLD AUTO: 0.41 K/UL (ref 0–0.85)
MONOCYTES NFR BLD AUTO: 6.8 % (ref 0–13.4)
NEUTROPHILS # BLD AUTO: 3.65 K/UL (ref 2–7.15)
NEUTROPHILS NFR BLD: 60.9 % (ref 44–72)
NRBC # BLD AUTO: 0 K/UL
NRBC BLD-RTO: 0 /100 WBC
PLATELET # BLD AUTO: 310 K/UL (ref 164–446)
PMV BLD AUTO: 11.1 FL (ref 9–12.9)
POTASSIUM SERPL-SCNC: 4 MMOL/L (ref 3.6–5.5)
PROT SERPL-MCNC: 7.7 G/DL (ref 6–8.2)
RBC # BLD AUTO: 4.91 M/UL (ref 4.2–5.4)
SODIUM SERPL-SCNC: 139 MMOL/L (ref 135–145)
TRIGL SERPL-MCNC: 103 MG/DL (ref 0–149)
TSH SERPL DL<=0.005 MIU/L-ACNC: 1.56 UIU/ML (ref 0.38–5.33)
WBC # BLD AUTO: 6 K/UL (ref 4.8–10.8)

## 2018-08-16 PROCEDURE — 80053 COMPREHEN METABOLIC PANEL: CPT

## 2018-08-16 PROCEDURE — 80061 LIPID PANEL: CPT

## 2018-08-16 PROCEDURE — 84443 ASSAY THYROID STIM HORMONE: CPT

## 2018-08-16 PROCEDURE — 82306 VITAMIN D 25 HYDROXY: CPT

## 2018-08-16 PROCEDURE — 85025 COMPLETE CBC W/AUTO DIFF WBC: CPT

## 2018-08-22 ENCOUNTER — OFFICE VISIT (OUTPATIENT)
Dept: INTERNAL MEDICINE | Facility: IMAGING CENTER | Age: 38
End: 2018-08-22
Payer: COMMERCIAL

## 2018-08-22 VITALS
TEMPERATURE: 97.9 F | DIASTOLIC BLOOD PRESSURE: 64 MMHG | HEART RATE: 78 BPM | SYSTOLIC BLOOD PRESSURE: 110 MMHG | BODY MASS INDEX: 28.73 KG/M2 | OXYGEN SATURATION: 98 % | HEIGHT: 69 IN | RESPIRATION RATE: 14 BRPM | WEIGHT: 194 LBS

## 2018-08-22 DIAGNOSIS — G47.33 OSA (OBSTRUCTIVE SLEEP APNEA): ICD-10-CM

## 2018-08-22 DIAGNOSIS — Z00.00 WELLNESS EXAMINATION: ICD-10-CM

## 2018-08-22 DIAGNOSIS — E78.2 MIXED DYSLIPIDEMIA: Chronic | ICD-10-CM

## 2018-08-22 DIAGNOSIS — F41.1 GAD (GENERALIZED ANXIETY DISORDER): Chronic | ICD-10-CM

## 2018-08-22 DIAGNOSIS — G47.8 UPPER AIRWAY RESISTANCE SYNDROME: Chronic | ICD-10-CM

## 2018-08-22 PROCEDURE — 99395 PREV VISIT EST AGE 18-39: CPT | Performed by: FAMILY MEDICINE

## 2018-08-22 ASSESSMENT — PATIENT HEALTH QUESTIONNAIRE - PHQ9: CLINICAL INTERPRETATION OF PHQ2 SCORE: 0

## 2018-08-22 NOTE — PROGRESS NOTES
"Chief Complaint   Patient presents with   • Annual Exam       HPI:  Patient is a 37 y.o. female established patient who presents today for her annual wellness exam. She reports that she has been in excellent health, continues to follow [place] style diet, exercises regularly, and stays busy with her young son Lincoln. She is UTD with her GYN exam with Dr. Calderon and is on daily OCP for birth control. She has chronic TRACEY well controlled with very infrequent Xanax use as well as lifestyle changes. She has chronic mixed dyslipidemia with elevated HDL and chronic AMELIA/ upper airway resistance syndrome with qhs autoCPAP use. She reports on and off compliance with CPAP but certainly feels more refreshed in the morning when using it consistently. She does not have any current health complaints and is in excellent spirits today at our visit.     Patient Active Problem List    Diagnosis Date Noted   • Mixed dyslipidemia 08/22/2018   • TRACEY (generalized anxiety disorder) 11/10/2017   • History of recurrent UTI (urinary tract infection) 09/20/2017   • Acne vulgaris 09/20/2017   • Upper airway resistance syndrome 05/26/2017   • AMELIA (obstructive sleep apnea) 04/03/2017     Past medical, surgical, family, and social history was reviewed and updated in Epic chart by me today.     Medications and allergies reviewed and updated in Epic chart by me today.     Lab results 8/16/18 reviewed with patient at visit today.     ROS:  Pertinent positives listed above in HPI. All other systems have been reviewed and are negative.    PE:   /64   Pulse 78   Temp 36.6 °C (97.9 °F)   Resp 14   Ht 1.74 m (5' 8.5\")   Wt 88 kg (194 lb)   LMP 08/01/2018   SpO2 98%   Breastfeeding? No   BMI 29.07 kg/m²   Vital signs reviewed with patient.     Gen: Well developed; well nourished; no acute distress; age appropriate appearance   HEENT: Normocephalic; atraumatic; PEERLA b/l; sclera clear b/l; b/l external auditory canals WNL; b/l TM WNL; " nares patent; oropharynx clear; oral mucosa moist; tongue midline; dentition adequate   Neck: No adenopathy; no thyromegaly  CV: Regular rate and rhythm; S1/ S2 present; no murmur, gallop or rub noted  Pulm: No respiratory distress; clear to ascultation b/l; no wheezing or stridor noted b/l  Abd: Adequate bowel sounds noted; soft and nontender; no rebound, rigidity, nor distention  Extremities: No peripheral edema b/l LE extremities/ no clubbing nor cyanosis noted  Skin: Warm and dry; no rashes noted   Neuro: No focal deficits noted   Psych: AAOx4; mood and affect are appropriate    A/P:  1. Chronic mixed dyslipidemia  Uncontrolled but HDL remains elevated/ recommend daily fish oil and/or red yeast rice supplementation, 5% weight loss, continued attention to healthy diet and repeat fasting lipid panel in 6 months.   - LIPID PROFILE; Future    2. Chronic upper airway resistance syndrome  Stable/ managed by [facility]    3. Chronic AMELIA (obstructive sleep apnea)  Stable/ managed with autoCPAP qhs per [facility]    4. Chronic TRACEY (generalized anxiety disorder)  Stable/ well controlled with lifestyle changes and very infrequent Xanax use.     5. Wellness examination  Pt is stable and UTD with all HCM items at this time.    Pt is stable at this time and is to return in six months for repeat fasting lipid panel.

## 2018-09-19 DIAGNOSIS — F51.01 PRIMARY INSOMNIA: ICD-10-CM

## 2018-09-19 DIAGNOSIS — F41.1 GAD (GENERALIZED ANXIETY DISORDER): ICD-10-CM

## 2018-09-19 DIAGNOSIS — L30.1 DYSHYDROSIS: ICD-10-CM

## 2018-09-19 RX ORDER — ALPRAZOLAM 0.25 MG/1
TABLET ORAL
Qty: 10 TAB | Refills: 2 | Status: SHIPPED
Start: 2018-09-19 | End: 2018-09-23

## 2018-09-19 RX ORDER — AMMONIUM LACTATE 12 G/100G
LOTION TOPICAL
Qty: 1 BOTTLE | Refills: 2 | Status: SHIPPED | OUTPATIENT
Start: 2018-09-19 | End: 2021-04-22 | Stop reason: SDUPTHER

## 2018-10-05 ENCOUNTER — NON-PROVIDER VISIT (OUTPATIENT)
Dept: INTERNAL MEDICINE | Facility: IMAGING CENTER | Age: 38
End: 2018-10-05
Payer: COMMERCIAL

## 2018-10-05 DIAGNOSIS — Z23 NEED FOR INFLUENZA VACCINATION: ICD-10-CM

## 2018-10-05 PROCEDURE — 90471 IMMUNIZATION ADMIN: CPT | Performed by: FAMILY MEDICINE

## 2018-10-05 PROCEDURE — 90686 IIV4 VACC NO PRSV 0.5 ML IM: CPT | Performed by: FAMILY MEDICINE

## 2019-04-03 ENCOUNTER — OFFICE VISIT (OUTPATIENT)
Dept: INTERNAL MEDICINE | Facility: IMAGING CENTER | Age: 39
End: 2019-04-03
Payer: COMMERCIAL

## 2019-04-03 VITALS
SYSTOLIC BLOOD PRESSURE: 119 MMHG | RESPIRATION RATE: 14 BRPM | HEIGHT: 69 IN | DIASTOLIC BLOOD PRESSURE: 71 MMHG | HEART RATE: 89 BPM | BODY MASS INDEX: 28.73 KG/M2 | TEMPERATURE: 98.1 F | WEIGHT: 194 LBS | OXYGEN SATURATION: 97 %

## 2019-04-03 DIAGNOSIS — B37.9 ANTIBIOTIC-INDUCED YEAST INFECTION: ICD-10-CM

## 2019-04-03 DIAGNOSIS — J02.9 PHARYNGITIS, UNSPECIFIED ETIOLOGY: ICD-10-CM

## 2019-04-03 DIAGNOSIS — R68.89 FLU-LIKE SYMPTOMS: ICD-10-CM

## 2019-04-03 DIAGNOSIS — T36.95XA ANTIBIOTIC-INDUCED YEAST INFECTION: ICD-10-CM

## 2019-04-03 DIAGNOSIS — J10.1 INFLUENZA A: ICD-10-CM

## 2019-04-03 LAB
FLUAV+FLUBV AG SPEC QL IA: NORMAL
INT CON NEG: NEGATIVE
INT CON POS: POSITIVE

## 2019-04-03 PROCEDURE — 87804 INFLUENZA ASSAY W/OPTIC: CPT | Performed by: FAMILY MEDICINE

## 2019-04-03 PROCEDURE — 99214 OFFICE O/P EST MOD 30 MIN: CPT | Performed by: FAMILY MEDICINE

## 2019-04-03 RX ORDER — FLUCONAZOLE 150 MG/1
150 TABLET ORAL DAILY
Qty: 3 TAB | Refills: 0 | Status: SHIPPED | OUTPATIENT
Start: 2019-04-03 | End: 2019-04-06

## 2019-04-03 RX ORDER — AMOXICILLIN AND CLAVULANATE POTASSIUM 875; 125 MG/1; MG/1
1 TABLET, FILM COATED ORAL 2 TIMES DAILY
Qty: 14 TAB | Refills: 0 | Status: SHIPPED | OUTPATIENT
Start: 2019-04-03 | End: 2019-04-10

## 2019-04-03 RX ORDER — OSELTAMIVIR PHOSPHATE 75 MG/1
75 CAPSULE ORAL 2 TIMES DAILY
Qty: 10 CAP | Refills: 0 | Status: SHIPPED | OUTPATIENT
Start: 2019-04-03 | End: 2019-06-11

## 2019-04-03 NOTE — PROGRESS NOTES
"Chief Complaint   Patient presents with   • Flu Like Symptoms     Started Friday night/Saturday   • Pharyngitis       HPI:  Patient is a 38 y.o. female established patient who presents today for evaluation of new illness symptoms present since Friday night. She has been having a sore throat along with general body aches, chills, Tm: 101.5, dry cough, congestion, and general malaise/moodiness. She denies associated N/V/D/bowel or bladder changes and her young son was sick with similar symptoms (prior to her illness). She has been taking Dayquil, Nyquil, and benadryl with minimal relief, and her  has avoided being sick by avoiding her accordingly.     Patient Active Problem List    Diagnosis Date Noted   • Mixed dyslipidemia 08/22/2018   • TRACEY (generalized anxiety disorder) 11/10/2017   • History of recurrent UTI (urinary tract infection) 09/20/2017   • Acne vulgaris 09/20/2017   • Upper airway resistance syndrome 05/26/2017   • AMELIA (obstructive sleep apnea) 04/03/2017       Past medical, surgical, family, and social history was reviewed in Epic chart by me today.     Medications and allergies reviewed and updated in Epic chart by me today.     ROS:  Pertinent positives listed above in HPI. All other systems have been reviewed and are negative.    PE:   /71 (BP Location: Right arm, Patient Position: Sitting, BP Cuff Size: Adult)   Pulse 89   Temp 36.7 °C (98.1 °F) (Temporal)   Resp 14   Ht 1.74 m (5' 8.5\")   Wt 88 kg (194 lb)   SpO2 97%   BMI 29.07 kg/m²   Vital signs reviewed with patient.     Gen: Well developed; well nourished; no acute distress; tired and ill appearance   HEENT: Normocephalic; atraumatic; PEERLA b/l; sclera clear b/l; b/l external auditory canals WNL; b/l TM WNL; nares patent; oropharynx clear but posterior aspect is red and infected; oral mucosa moist; tongue midline; dentition adequate; congested  Neck: No adenopathy; no thyromegaly  CV: Regular rate and rhythm; S1/ S2 " present; no murmur, gallop or rub noted  Pulm: No respiratory distress; clear to ascultation b/l; no wheezing or stridor noted b/l; dry cough  Abd: Adequate bowel sounds noted; soft and nontender; no rebound, rigidity, nor distention  Extremities: No peripheral edema b/l LE extremities/ no clubbing nor cyanosis noted  Skin: Warm and dry; no rashes noted   Neuro: No focal deficits noted   Psych: AAOx4; mood and affect are appropriate    A/P:  1. Flu-like symptoms  Pt is positive for Influenza A at visit today.   - POCT Influenza A/B    2. Influenza A  Recommend patient start Tamiflu this morning, increase oral hydration, stay out of public contact, practice good hand hygiene, rest, and follow clinical response. I will also send in Tamifly for her  Ranjeet (a patient of mine) due to flu exposure.   - oseltamivir (TAMIFLU) 75 MG Cap; Take 1 Cap by mouth 2 times a day.  Dispense: 10 Cap; Refill: 0    3. Pharyngitis, unspecified etiology  Recommend patient start abx use today.   - amoxicillin-clavulanate (AUGMENTIN) 875-125 MG Tab; Take 1 Tab by mouth 2 times a day for 7 days.  Dispense: 14 Tab; Refill: 0    4. Antibiotic-induced yeast infection  Patient is prone to vaginal yeast infections with abx use. She is to start medication for treatment as needed.   - fluconazole (DIFLUCAN) 150 MG tablet; Take 1 Tab by mouth every day for 3 days.  Dispense: 3 Tab; Refill: 0     Pt is to contact our office if current conditions do not resolve with treatment plans detailed above.

## 2019-06-11 ENCOUNTER — OFFICE VISIT (OUTPATIENT)
Dept: INTERNAL MEDICINE | Facility: IMAGING CENTER | Age: 39
End: 2019-06-11
Payer: COMMERCIAL

## 2019-06-11 VITALS
OXYGEN SATURATION: 100 % | WEIGHT: 194 LBS | BODY MASS INDEX: 28.73 KG/M2 | DIASTOLIC BLOOD PRESSURE: 65 MMHG | TEMPERATURE: 98.4 F | HEART RATE: 91 BPM | SYSTOLIC BLOOD PRESSURE: 105 MMHG | RESPIRATION RATE: 16 BRPM | HEIGHT: 69 IN

## 2019-06-11 DIAGNOSIS — J06.9 UPPER RESPIRATORY TRACT INFECTION, UNSPECIFIED TYPE: ICD-10-CM

## 2019-06-11 PROCEDURE — 99214 OFFICE O/P EST MOD 30 MIN: CPT | Performed by: FAMILY MEDICINE

## 2019-06-11 RX ORDER — AZITHROMYCIN 250 MG/1
TABLET, FILM COATED ORAL
Qty: 6 TAB | Refills: 0 | Status: SHIPPED | OUTPATIENT
Start: 2019-06-11 | End: 2019-07-12

## 2019-06-11 NOTE — PROGRESS NOTES
"Chief Complaint   Patient presents with   • Cough     Started a week ago        HPI:  Patient is a 38 y.o. female established patient who presents today for evaluation of new URI symptoms present since last week. She reports onset of cold/allergy type of symptoms that transitioned into hacking cough with colored sputum, sore throat, nasal congestion, and malaise without associated N/V/D/F/bowel or bladder changes. She has tried Allegra, Benadryl, and more recently Dayquil without resolution of her symptoms. She has a young son and  who are well, and they have an upcoming trip to [place] this weekend. She is frustrated about being sick again (treated for pharyngitis and Influenza A in April 2019).     Patient Active Problem List    Diagnosis Date Noted   • Mixed dyslipidemia 08/22/2018   • TRACEY (generalized anxiety disorder) 11/10/2017   • History of recurrent UTI (urinary tract infection) 09/20/2017   • Acne vulgaris 09/20/2017   • Upper airway resistance syndrome 05/26/2017   • AMELIA (obstructive sleep apnea) 04/03/2017       Past medical, surgical, family, and social history was reviewed in Epic chart by me today.     Medications and allergies reviewed and updated in Epic chart by me today.     ROS:  Pertinent positives listed above in HPI. All other systems have been reviewed and are negative.    PE:   /65 (BP Location: Left arm, Patient Position: Sitting, BP Cuff Size: Adult)   Pulse 91   Temp 36.9 °C (98.4 °F) (Temporal)   Resp 16   Ht 1.74 m (5' 8.5\")   Wt 88 kg (194 lb)   SpO2 100%   BMI 29.07 kg/m²   Vital signs reviewed with patient.     Gen: Well developed; well nourished; no acute distress; non toxic appearance   HEENT: Normocephalic; atraumatic; PEERLA b/l; sclera clear b/l; b/l external auditory canals WNL; b/l TM WNL; nares patent; oropharynx clear but tonsils are red and enlarged; oral mucosa moist; tongue midline; dentition adequate; congested   Neck: mild b/l anterior cervical " chain adenopathy; no thyromegaly  CV: Regular rate and rhythm; S1/ S2 present; no murmur, gallop or rub noted  Pulm: No respiratory distress; clear to ascultation b/l; no wheezing or stridor noted b/l; hacking cough  Abd: Adequate bowel sounds noted; soft and nontender; no rebound, rigidity, nor distention  Extremities: No peripheral edema b/l LE extremities/ no clubbing nor cyanosis noted  Skin: Warm and dry; no rashes noted   Neuro: No focal deficits noted   Psych: AAOx4; mood and affect are appropriate    A/P:  1. Upper respiratory tract infection, unspecified type  Patient has failed supportive care measures and abx are warranted at this time. She did not tolerate Augmentin well in April so will use Azithromycin. She can continue OTC medications PRN and add Delsym for cough control, maintain hydration, rest, and follow clinical response. New RX sent to pharmacy. I also recommend that she take daily probiotic and Vitamin C product to improve her overall immunity moving forward.   - azithromycin (ZITHROMAX) 250 MG Tab; Take tablets by mouth as directed.  Dispense: 6 Tab; Refill: 0      Pt is to contact our office if current condition does not resolve with treatment plans detailed above.

## 2019-06-14 DIAGNOSIS — R05.9 COUGH: ICD-10-CM

## 2019-07-05 DIAGNOSIS — Z00.00 HEALTH CARE MAINTENANCE: ICD-10-CM

## 2019-07-05 DIAGNOSIS — E78.2 MIXED DYSLIPIDEMIA: Chronic | ICD-10-CM

## 2019-07-08 ENCOUNTER — NON-PROVIDER VISIT (OUTPATIENT)
Dept: INTERNAL MEDICINE | Facility: IMAGING CENTER | Age: 39
End: 2019-07-08
Payer: COMMERCIAL

## 2019-07-08 ENCOUNTER — HOSPITAL ENCOUNTER (OUTPATIENT)
Facility: MEDICAL CENTER | Age: 39
End: 2019-07-08
Attending: FAMILY MEDICINE
Payer: COMMERCIAL

## 2019-07-08 DIAGNOSIS — Z00.00 HEALTH CARE MAINTENANCE: ICD-10-CM

## 2019-07-08 DIAGNOSIS — E78.2 MIXED DYSLIPIDEMIA: Chronic | ICD-10-CM

## 2019-07-08 LAB
25(OH)D3 SERPL-MCNC: 28 NG/ML (ref 30–100)
ALBUMIN SERPL BCP-MCNC: 4.3 G/DL (ref 3.2–4.9)
ALBUMIN/GLOB SERPL: 1.4 G/DL
ALP SERPL-CCNC: 52 U/L (ref 30–99)
ALT SERPL-CCNC: 24 U/L (ref 2–50)
ANION GAP SERPL CALC-SCNC: 6 MMOL/L (ref 0–11.9)
AST SERPL-CCNC: 25 U/L (ref 12–45)
BASOPHILS # BLD AUTO: 0.6 % (ref 0–1.8)
BASOPHILS # BLD: 0.04 K/UL (ref 0–0.12)
BILIRUB SERPL-MCNC: 0.7 MG/DL (ref 0.1–1.5)
BUN SERPL-MCNC: 11 MG/DL (ref 8–22)
CALCIUM SERPL-MCNC: 9.8 MG/DL (ref 8.5–10.5)
CHLORIDE SERPL-SCNC: 105 MMOL/L (ref 96–112)
CHOLEST SERPL-MCNC: 213 MG/DL (ref 100–199)
CO2 SERPL-SCNC: 25 MMOL/L (ref 20–33)
CREAT SERPL-MCNC: 0.78 MG/DL (ref 0.5–1.4)
EOSINOPHIL # BLD AUTO: 0.06 K/UL (ref 0–0.51)
EOSINOPHIL NFR BLD: 0.9 % (ref 0–6.9)
ERYTHROCYTE [DISTWIDTH] IN BLOOD BY AUTOMATED COUNT: 44.1 FL (ref 35.9–50)
EST. AVERAGE GLUCOSE BLD GHB EST-MCNC: 103 MG/DL
GLOBULIN SER CALC-MCNC: 3.1 G/DL (ref 1.9–3.5)
GLUCOSE SERPL-MCNC: 87 MG/DL (ref 65–99)
HBA1C MFR BLD: 5.2 % (ref 0–5.6)
HCT VFR BLD AUTO: 46.1 % (ref 37–47)
HDLC SERPL-MCNC: 66 MG/DL
HGB BLD-MCNC: 14.8 G/DL (ref 12–16)
IMM GRANULOCYTES # BLD AUTO: 0.02 K/UL (ref 0–0.11)
IMM GRANULOCYTES NFR BLD AUTO: 0.3 % (ref 0–0.9)
LDLC SERPL CALC-MCNC: 125 MG/DL
LYMPHOCYTES # BLD AUTO: 2.52 K/UL (ref 1–4.8)
LYMPHOCYTES NFR BLD: 36.2 % (ref 22–41)
MCH RBC QN AUTO: 31.4 PG (ref 27–33)
MCHC RBC AUTO-ENTMCNC: 32.1 G/DL (ref 33.6–35)
MCV RBC AUTO: 97.7 FL (ref 81.4–97.8)
MONOCYTES # BLD AUTO: 0.4 K/UL (ref 0–0.85)
MONOCYTES NFR BLD AUTO: 5.7 % (ref 0–13.4)
NEUTROPHILS # BLD AUTO: 3.93 K/UL (ref 2–7.15)
NEUTROPHILS NFR BLD: 56.3 % (ref 44–72)
NRBC # BLD AUTO: 0 K/UL
NRBC BLD-RTO: 0 /100 WBC
PLATELET # BLD AUTO: 262 K/UL (ref 164–446)
PMV BLD AUTO: 11 FL (ref 9–12.9)
POTASSIUM SERPL-SCNC: 3.8 MMOL/L (ref 3.6–5.5)
PROT SERPL-MCNC: 7.4 G/DL (ref 6–8.2)
RBC # BLD AUTO: 4.72 M/UL (ref 4.2–5.4)
SODIUM SERPL-SCNC: 136 MMOL/L (ref 135–145)
TRIGL SERPL-MCNC: 109 MG/DL (ref 0–149)
TSH SERPL DL<=0.005 MIU/L-ACNC: 2.07 UIU/ML (ref 0.38–5.33)
WBC # BLD AUTO: 7 K/UL (ref 4.8–10.8)

## 2019-07-08 PROCEDURE — 82306 VITAMIN D 25 HYDROXY: CPT

## 2019-07-08 PROCEDURE — 85025 COMPLETE CBC W/AUTO DIFF WBC: CPT

## 2019-07-08 PROCEDURE — 80061 LIPID PANEL: CPT

## 2019-07-08 PROCEDURE — 80053 COMPREHEN METABOLIC PANEL: CPT

## 2019-07-08 PROCEDURE — 83036 HEMOGLOBIN GLYCOSYLATED A1C: CPT

## 2019-07-08 PROCEDURE — 84443 ASSAY THYROID STIM HORMONE: CPT

## 2019-07-12 ENCOUNTER — OFFICE VISIT (OUTPATIENT)
Dept: INTERNAL MEDICINE | Facility: IMAGING CENTER | Age: 39
End: 2019-07-12
Payer: COMMERCIAL

## 2019-07-12 VITALS
DIASTOLIC BLOOD PRESSURE: 72 MMHG | HEIGHT: 69 IN | RESPIRATION RATE: 17 BRPM | TEMPERATURE: 98.8 F | OXYGEN SATURATION: 94 % | WEIGHT: 194 LBS | HEART RATE: 75 BPM | SYSTOLIC BLOOD PRESSURE: 119 MMHG | BODY MASS INDEX: 28.73 KG/M2

## 2019-07-12 DIAGNOSIS — E78.2 MIXED DYSLIPIDEMIA: Chronic | ICD-10-CM

## 2019-07-12 DIAGNOSIS — E55.9 VITAMIN D DEFICIENCY: ICD-10-CM

## 2019-07-12 PROCEDURE — 99213 OFFICE O/P EST LOW 20 MIN: CPT | Performed by: FAMILY MEDICINE

## 2019-07-12 NOTE — PROGRESS NOTES
"Chief Complaint   Patient presents with   • Results     Review lab results.       HPI:  Patient is a 38 y.o. female established patient who presents today for a counseling appointment to review her labs done 7/8/19. She continues to struggle with weight management and has made improvements in her diet within the past few months. She denies other new health concerns and remains busy with her family.     Patient Active Problem List    Diagnosis Date Noted   • Vitamin D deficiency 07/12/2019   • Mixed dyslipidemia 08/22/2018   • TRACEY (generalized anxiety disorder) 11/10/2017   • History of recurrent UTI (urinary tract infection) 09/20/2017   • Acne vulgaris 09/20/2017   • Upper airway resistance syndrome 05/26/2017   • AMELIA (obstructive sleep apnea) 04/03/2017       Past medical, surgical, family, and social history was reviewed in Epic chart by me today.     Medications and allergies reviewed and updated in Epic chart by me today.     Lab results from 7/8/19 reviewed in detail with patient at visit today.     ROS:  Pertinent positives listed above in HPI. All other systems have been reviewed and are negative.    PE:   /72 (BP Location: Left arm, Patient Position: Sitting, BP Cuff Size: Adult)   Pulse 75   Temp 37.1 °C (98.8 °F) (Temporal)   Resp 17   Ht 1.74 m (5' 8.5\")   Wt 88 kg (194 lb)   SpO2 94%   BMI 29.07 kg/m²   Vital signs reviewed with patient.     Gen: Well developed; well nourished; no acute distress; age appropriate appearance   Skin: Warm and dry; no rashes noted   Neuro: No focal deficits noted   Psych: AAOx4; mood and affect are appropriate    A/P:  1. Vitamin D deficiency  Uncontrolled/ recommend patient start taking 1000 IU daily.     2. Mixed dyslipidemia  Uncontrolled and likely familial/genetic component to this chronic medical condition. Recommend starting statin therapy nightly and will repeat fasting lipid panel and CK level in 3-4 months. Risks versus benefits of medication use " reviewed with patient, and she will go home to consider her options.  She will contact me in the near future with her decision.    Face to face time: 20 minutes spent with greater than 50% of time spent with direct patient care and counseling about diagnosis, prognosis, risk versus benefits of treatment, and importance of compliance with instructions. All questions answered and support provided during visit today.

## 2019-08-19 DIAGNOSIS — E78.2 MIXED DYSLIPIDEMIA: Chronic | ICD-10-CM

## 2019-08-19 DIAGNOSIS — F41.1 GAD (GENERALIZED ANXIETY DISORDER): Chronic | ICD-10-CM

## 2019-08-19 DIAGNOSIS — F51.01 PRIMARY INSOMNIA: ICD-10-CM

## 2019-08-19 RX ORDER — ALPRAZOLAM 0.25 MG/1
0.25 TABLET ORAL 2 TIMES DAILY PRN
Qty: 30 TAB | Refills: 2 | Status: SHIPPED
Start: 2019-08-19 | End: 2019-09-03

## 2019-08-19 RX ORDER — ROSUVASTATIN CALCIUM 10 MG/1
10 TABLET, COATED ORAL EVERY EVENING
Qty: 30 TAB | Refills: 11 | Status: SHIPPED | OUTPATIENT
Start: 2019-08-19 | End: 2020-06-29

## 2019-09-26 ENCOUNTER — NON-PROVIDER VISIT (OUTPATIENT)
Dept: INTERNAL MEDICINE | Facility: IMAGING CENTER | Age: 39
End: 2019-09-26
Payer: COMMERCIAL

## 2019-09-26 DIAGNOSIS — Z23 NEED FOR VACCINATION: ICD-10-CM

## 2019-09-26 PROCEDURE — 90686 IIV4 VACC NO PRSV 0.5 ML IM: CPT | Performed by: FAMILY MEDICINE

## 2019-09-26 PROCEDURE — 90471 IMMUNIZATION ADMIN: CPT | Performed by: FAMILY MEDICINE

## 2019-10-06 ENCOUNTER — HOSPITAL ENCOUNTER (OUTPATIENT)
Facility: MEDICAL CENTER | Age: 39
End: 2019-10-06
Attending: PHYSICIAN ASSISTANT
Payer: COMMERCIAL

## 2019-10-06 ENCOUNTER — OFFICE VISIT (OUTPATIENT)
Dept: URGENT CARE | Facility: CLINIC | Age: 39
End: 2019-10-06
Payer: COMMERCIAL

## 2019-10-06 VITALS
DIASTOLIC BLOOD PRESSURE: 80 MMHG | OXYGEN SATURATION: 99 % | BODY MASS INDEX: 29.68 KG/M2 | RESPIRATION RATE: 16 BRPM | SYSTOLIC BLOOD PRESSURE: 116 MMHG | TEMPERATURE: 97.9 F | HEART RATE: 82 BPM | WEIGHT: 200.4 LBS | HEIGHT: 69 IN

## 2019-10-06 DIAGNOSIS — N30.01 ACUTE CYSTITIS WITH HEMATURIA: ICD-10-CM

## 2019-10-06 LAB
APPEARANCE UR: CLEAR
BILIRUB UR STRIP-MCNC: NORMAL MG/DL
COLOR UR AUTO: YELLOW
GLUCOSE UR STRIP.AUTO-MCNC: NORMAL MG/DL
KETONES UR STRIP.AUTO-MCNC: NORMAL MG/DL
LEUKOCYTE ESTERASE UR QL STRIP.AUTO: NORMAL
NITRITE UR QL STRIP.AUTO: NORMAL
PH UR STRIP.AUTO: 7 [PH] (ref 5–8)
PROT UR QL STRIP: NORMAL MG/DL
RBC UR QL AUTO: NORMAL
SP GR UR STRIP.AUTO: 1.01
UROBILINOGEN UR STRIP-MCNC: 0.2 MG/DL

## 2019-10-06 PROCEDURE — 99000 SPECIMEN HANDLING OFFICE-LAB: CPT | Performed by: PHYSICIAN ASSISTANT

## 2019-10-06 PROCEDURE — 87086 URINE CULTURE/COLONY COUNT: CPT

## 2019-10-06 PROCEDURE — 99214 OFFICE O/P EST MOD 30 MIN: CPT | Performed by: PHYSICIAN ASSISTANT

## 2019-10-06 PROCEDURE — 81002 URINALYSIS NONAUTO W/O SCOPE: CPT | Performed by: PHYSICIAN ASSISTANT

## 2019-10-06 RX ORDER — NITROFURANTOIN 25; 75 MG/1; MG/1
100 CAPSULE ORAL 2 TIMES DAILY
Qty: 10 CAP | Refills: 0 | Status: SHIPPED | OUTPATIENT
Start: 2019-10-06 | End: 2019-10-06 | Stop reason: SDUPTHER

## 2019-10-06 RX ORDER — PHENAZOPYRIDINE HYDROCHLORIDE 200 MG/1
200 TABLET, FILM COATED ORAL 3 TIMES DAILY
Qty: 6 TAB | Refills: 0 | Status: SHIPPED | OUTPATIENT
Start: 2019-10-06 | End: 2019-10-08

## 2019-10-06 RX ORDER — NITROFURANTOIN 25; 75 MG/1; MG/1
100 CAPSULE ORAL 2 TIMES DAILY
Qty: 10 CAP | Refills: 0 | Status: SHIPPED | OUTPATIENT
Start: 2019-10-06 | End: 2019-10-31

## 2019-10-06 RX ORDER — PHENAZOPYRIDINE HYDROCHLORIDE 200 MG/1
200 TABLET, FILM COATED ORAL 3 TIMES DAILY
Qty: 6 TAB | Refills: 0 | Status: SHIPPED | OUTPATIENT
Start: 2019-10-06 | End: 2019-10-06 | Stop reason: SDUPTHER

## 2019-10-06 NOTE — PROGRESS NOTES
Chief Complaint   Patient presents with   • Urinary Frequency     x this am, urinary frequency       HISTORY OF PRESENT ILLNESS: Patient is a 39 y.o. female who presents today for about 6 hours of possible UTI symptoms.  Patient states she has had multiple in the past (although not much recently) and recognized the symptoms of urgency right away.  No pain or burning at this point.  No hematuria.  No abdominal pain, flank pain. No nausea, vomiting, fever or chills. No flank or back pain.  No interventions thus far.     Patient Active Problem List    Diagnosis Date Noted   • Primary insomnia 08/19/2019   • Vitamin D deficiency 07/12/2019   • Mixed dyslipidemia 08/22/2018   • TRACEY (generalized anxiety disorder) 11/10/2017   • History of recurrent UTI (urinary tract infection) 09/20/2017   • Acne vulgaris 09/20/2017   • Upper airway resistance syndrome 05/26/2017   • AMELIA (obstructive sleep apnea) 04/03/2017       Allergies:Patient has no known allergies.    Current Outpatient Medications Ordered in Epic   Medication Sig Dispense Refill   • nitrofurantoin monohyd macro (MACROBID) 100 MG Cap Take 1 Cap by mouth 2 times a day. 10 Cap 0   • phenazopyridine (PYRIDIUM) 200 MG Tab Take 1 Tab by mouth 3 times a day for 2 days. 6 Tab 0   • rosuvastatin (CRESTOR) 10 MG Tab Take 1 Tab by mouth every evening. 30 Tab 11   • LO LOESTRIN FE 1 MG-10 MCG / 10 MCG Tab      • ammonium lactate (LAC-HYDRIN) 12 % Lotion Apply to feet BID prn 1 Bottle 2     No current Epic-ordered facility-administered medications on file.        Past Medical History:   Diagnosis Date   • Acne    • Chickenpox    • History of recurrent UTIs    • Influenza    • Upper airway resistance syndrome        Social History     Tobacco Use   • Smoking status: Passive Smoke Exposure - Never Smoker   • Smokeless tobacco: Never Used   Substance Use Topics   • Alcohol use: Yes     Alcohol/week: 4.2 oz     Types: 7 Glasses of wine per week     Comment: occ   • Drug use: No  "      Family Status   Relation Name Status   • Mo  Alive   • Fa  Alive   • MGMo     • MGFa     • PGMo     • PGFa       Family History   Problem Relation Age of Onset   • Hypertension Mother    • Sleep Apnea Father    • Heart Disease Maternal Grandmother    • Cancer Paternal Grandmother    • Breast Cancer Paternal Grandmother    • Cancer Paternal Grandfather    • Lung Cancer Paternal Grandfather        ROS:  Review of Systems   Constitutional: Negative for fever, chills, weight loss and malaise/fatigue.   HENT: Negative for ear pain, nosebleeds, congestion, sore throat and neck pain.    Eyes: Negative for blurred vision.   Respiratory: Negative for cough, sputum production, shortness of breath and wheezing.    Cardiovascular: Negative for chest pain, palpitations, orthopnea and leg swelling.   Gastrointestinal: Negative for heartburn, nausea, vomiting and abdominal pain.   Genitourinary: SEE HPI    Exam:  /80 (BP Location: Left arm, Patient Position: Sitting, BP Cuff Size: Large adult)   Pulse 82   Temp 36.6 °C (97.9 °F) (Temporal)   Resp 16   Ht 1.753 m (5' 9\")   Wt 90.9 kg (200 lb 6.4 oz)   SpO2 99%   General:  Well nourished, well developed female in NAD  Eyes: PERRLA, EOM within normal limits, no conjunctival injection, no scleral icterus, visual fields and acuity grossly intact.  Neck: no masses, range of motion within normal limits, no tracheal deviation. No lymphadenopathy  Pulmonary: Normal respiratory effort, no wheezes, crackles, or rhonchi.  Cardiovascular: regular rate and rhythm without murmurs, rubs, or gallops.  Abdomen: Soft, nontender, nondistended. Normal bowel sounds. No hepatosplenomegaly or masses, or hernias. No rebound or guarding. No CVA tenderness.   Skin: No visible rashes or lesion. Warm, pink, dry.   Extremities: no clubbing, cyanosis, or edema.  Neuro: A&O x 3. Speech normal/clear.  Normal gait.         Assessment/Plan:  1. Acute cystitis with " hematuria  POCT Urinalysis    URINE CULTURE(NEW)    nitrofurantoin monohyd macro (MACROBID) 100 MG Cap    phenazopyridine (PYRIDIUM) 200 MG Tab    DISCONTINUED: nitrofurantoin monohyd macro (MACROBID) 100 MG Cap    DISCONTINUED: phenazopyridine (PYRIDIUM) 200 MG Tab           -POCT U/A with blood/leuks  Culture sent  -Fluids/staying well hydrated emphasized.   Avoidance of bladder irritants.   -void before/after intercourse.  Recommend abstain until treatment complete/symptoms resolved.   -signs and symptoms of worsening/ascending infection discussed and to seek prompt medical care should they arise.       Supportive care, differential diagnoses, and indications for immediate follow-up discussed with patient.   Pathogenesis of diagnosis discussed including typical length and natural progression.   Instructed to return to clinic or nearest emergency department for any change in condition, further concerns, or worsening of symptoms.  Patient states understanding of the plan of care and discharge instructions.        Mela De La Torre P.A.-C.

## 2019-10-07 DIAGNOSIS — N30.01 ACUTE CYSTITIS WITH HEMATURIA: ICD-10-CM

## 2019-10-09 LAB
BACTERIA UR CULT: NORMAL
SIGNIFICANT IND 70042: NORMAL
SITE SITE: NORMAL
SOURCE SOURCE: NORMAL

## 2019-10-10 ENCOUNTER — TELEPHONE (OUTPATIENT)
Dept: URGENT CARE | Facility: CLINIC | Age: 39
End: 2019-10-10

## 2019-10-11 NOTE — TELEPHONE ENCOUNTER
----- Message from Mela De La Torre P.A.-C. sent at 10/10/2019  4:50 PM PDT -----  Regarding: RE: Review lab results   Please let patient know that she did not grow an infection therefore does not need antibiotics.   Thank you!      ----- Message -----  From: Mela Franklin, Med Ass't  Sent: 10/10/2019   3:46 PM PDT  To: Mela De La Torre P.A.-C.  Subject: Review lab results                               Can you review the lab results for the urine culture that you ordered on 10/06/19. Please advise next step.    Mela Franklin

## 2019-10-12 ENCOUNTER — TELEPHONE (OUTPATIENT)
Dept: URGENT CARE | Facility: CLINIC | Age: 39
End: 2019-10-12

## 2019-10-12 NOTE — TELEPHONE ENCOUNTER
10/12/19 I called the patient, relayed test results as per Mela NUGENT. The patient was appreciative of the call.SAH    ----- Message from Mela De La Torre P.A.-C. sent at 10/10/2019  4:50 PM PDT -----  Regarding: RE: Review lab results   Please let patient know that she did not grow an infection therefore does not need antibiotics.   Thank you!      ----- Message -----  From: Mela Franklin, Med Ass't  Sent: 10/10/2019   3:46 PM PDT  To: Mela De La Torre P.A.-C.  Subject: Review lab results                               Can you review the lab results for the urine culture that you ordered on 10/06/19. Please advise next step.    Mela Franklin

## 2019-10-31 ENCOUNTER — OFFICE VISIT (OUTPATIENT)
Dept: INTERNAL MEDICINE | Facility: IMAGING CENTER | Age: 39
End: 2019-10-31
Payer: COMMERCIAL

## 2019-10-31 VITALS
TEMPERATURE: 98.9 F | HEIGHT: 69 IN | RESPIRATION RATE: 17 BRPM | BODY MASS INDEX: 29.68 KG/M2 | WEIGHT: 200.4 LBS | OXYGEN SATURATION: 98 % | DIASTOLIC BLOOD PRESSURE: 64 MMHG | HEART RATE: 94 BPM | SYSTOLIC BLOOD PRESSURE: 113 MMHG

## 2019-10-31 DIAGNOSIS — J06.9 UPPER RESPIRATORY TRACT INFECTION, UNSPECIFIED TYPE: ICD-10-CM

## 2019-10-31 DIAGNOSIS — B37.31 VAGINAL YEAST INFECTION: ICD-10-CM

## 2019-10-31 PROBLEM — R87.810 CERVICAL HIGH RISK HUMAN PAPILLOMAVIRUS (HPV) DNA TEST POSITIVE: Status: ACTIVE | Noted: 2019-07-15

## 2019-10-31 PROCEDURE — 99214 OFFICE O/P EST MOD 30 MIN: CPT | Performed by: FAMILY MEDICINE

## 2019-10-31 RX ORDER — ERGOCALCIFEROL (VITAMIN D2) 10 MCG
TABLET ORAL
COMMUNITY
Start: 2019-06-24 | End: 2020-09-03

## 2019-10-31 RX ORDER — FLUCONAZOLE 150 MG/1
150 TABLET ORAL DAILY
Qty: 3 TAB | Refills: 0 | Status: SHIPPED | OUTPATIENT
Start: 2019-10-31 | End: 2019-11-03

## 2019-10-31 RX ORDER — AMMONIUM LACTATE 12 G/100G
1 LOTION TOPICAL 2 TIMES DAILY PRN
Qty: 1 BOTTLE | Refills: 2 | Status: SHIPPED
Start: 2019-10-31 | End: 2020-01-23

## 2019-10-31 RX ORDER — UREA 10 %
1 LOTION (ML) TOPICAL EVERY EVENING
COMMUNITY
Start: 2019-06-24

## 2019-10-31 RX ORDER — AZITHROMYCIN 250 MG/1
TABLET, FILM COATED ORAL
Qty: 6 TAB | Refills: 0 | Status: SHIPPED
Start: 2019-10-31 | End: 2020-05-27

## 2019-10-31 NOTE — PROGRESS NOTES
"Chief Complaint   Patient presents with   • URI     Started with sore throat, fatigue, cough, congestion. x a few weeks.        HPI:  Patient is a 39 y.o. female established patient who presents today for new URI symptoms present intermittently over the past week to weeks. She reports intermittent nasal congestion, escalating sore throat, hacking cough with morning sputum, and fatigue without associated N/V/D/F/bowel or bladder changes. She has been taking appropriate OTC medications without symptom resolution and states that her son Lincoln is well at this time.     Patient Active Problem List    Diagnosis Date Noted   • Primary insomnia 08/19/2019   • Cervical high risk human papillomavirus (HPV) DNA test positive 07/15/2019   • Vitamin D deficiency 07/12/2019   • Mixed dyslipidemia 08/22/2018   • TRACEY (generalized anxiety disorder) 11/10/2017   • History of recurrent UTI (urinary tract infection) 09/20/2017   • Acne vulgaris 09/20/2017   • Upper airway resistance syndrome 05/26/2017   • AMELIA (obstructive sleep apnea) 04/03/2017       Past medical, surgical, family, and social history was reviewed and updated in Epic chart by me today.     Medications and allergies reviewed and updated in Epic chart by me today.     ROS:  Pertinent positives listed above in HPI. All other systems have been reviewed and are negative.    PE:   /64 (BP Location: Left arm, Patient Position: Sitting, BP Cuff Size: Adult)   Pulse 94   Temp 37.2 °C (98.9 °F) (Temporal)   Resp 17   Ht 1.753 m (5' 9\")   Wt 90.9 kg (200 lb 6.4 oz)   SpO2 98%   BMI 29.59 kg/m²   Vital signs reviewed with patient.     Gen: Well developed; well nourished; no acute distress; non toxic appearance   HEENT: Normocephalic; atraumatic; PEERLA b/l; sclera clear b/l; b/l external auditory canals WNL; b/l TM WNL; nares patent; oropharynx clear but posterior aspect is red and inflamed; oral mucosa moist; tongue midline; dentition adequate; congested   Neck: No " adenopathy; no thyromegaly  CV: Regular rate and rhythm; S1/ S2 present; no murmur, gallop or rub noted  Pulm: No respiratory distress; clear to ascultation b/l; no wheezing or stridor noted b/l; hacking dry cough  Abd: Adequate bowel sounds noted; soft and nontender; no rebound, rigidity, nor distention   Extremities: No peripheral edema b/l LE extremities/ no clubbing nor cyanosis noted  Skin: Warm and dry; no rashes noted   Neuro: No focal deficits noted   Psych: AAOx4; mood and affect are appropriate    A/P:  1. Upper respiratory tract infection, unspecified type  Patient has been sick for greater than one week and has failed OTC medication use. Recommend starting Z pack today, rest, hydrate, use Delsym for cough control, and follow clinical condition. New RX sent to pharmacy.  - azithromycin (ZITHROMAX) 250 MG Tab; Take tablets by mouth as directed.  Dispense: 6 Tab; Refill: 0    2. Vaginal yeast infection  Pt is prone to yeast infections when taking abx so will send in Diflucan RX that she can start if needed.   - fluconazole (DIFLUCAN) 150 MG tablet; Take 1 Tab by mouth every day for 3 days.  Dispense: 3 Tab; Refill: 0    Pt is to contact our office if current condition does not resolve with treatment plans detailed above.

## 2019-12-13 ENCOUNTER — APPOINTMENT (OUTPATIENT)
Dept: INTERNAL MEDICINE | Facility: IMAGING CENTER | Age: 39
End: 2019-12-13
Payer: COMMERCIAL

## 2020-01-23 ENCOUNTER — OFFICE VISIT (OUTPATIENT)
Dept: INTERNAL MEDICINE | Facility: IMAGING CENTER | Age: 40
End: 2020-01-23
Payer: COMMERCIAL

## 2020-01-23 VITALS
SYSTOLIC BLOOD PRESSURE: 122 MMHG | HEIGHT: 69 IN | OXYGEN SATURATION: 100 % | HEART RATE: 91 BPM | WEIGHT: 200.4 LBS | DIASTOLIC BLOOD PRESSURE: 64 MMHG | RESPIRATION RATE: 17 BRPM | BODY MASS INDEX: 29.68 KG/M2 | TEMPERATURE: 98.2 F

## 2020-01-23 DIAGNOSIS — J01.81 OTHER ACUTE RECURRENT SINUSITIS: ICD-10-CM

## 2020-01-23 PROCEDURE — 99214 OFFICE O/P EST MOD 30 MIN: CPT | Performed by: FAMILY MEDICINE

## 2020-01-23 RX ORDER — AZITHROMYCIN 250 MG/1
TABLET, FILM COATED ORAL
Qty: 6 TAB | Refills: 0 | Status: SHIPPED
Start: 2020-01-23 | End: 2020-05-27

## 2020-01-23 ASSESSMENT — PATIENT HEALTH QUESTIONNAIRE - PHQ9: CLINICAL INTERPRETATION OF PHQ2 SCORE: 0

## 2020-01-23 NOTE — PROGRESS NOTES
"Chief Complaint   Patient presents with   • Sinus Problem     Started last Monday       HPI:  Patient is a 39 y.o. female established patient who presents today for evaluation of new sinus infection illness that started on January 13. She reports having sinus pressure with drainage, dry cough, headache, ear fullness, and fatigue without associated N/V/D/F/bowel or bladder changes. She reports sick contact exposure and has been taking appropriate OTC medications without resolution.     Patient Active Problem List    Diagnosis Date Noted   • Primary insomnia 08/19/2019   • Cervical high risk human papillomavirus (HPV) DNA test positive 07/15/2019   • Vitamin D deficiency 07/12/2019   • Mixed dyslipidemia 08/22/2018   • TRACEY (generalized anxiety disorder) 11/10/2017   • History of recurrent UTI (urinary tract infection) 09/20/2017   • Acne vulgaris 09/20/2017   • Upper airway resistance syndrome 05/26/2017   • AMELIA (obstructive sleep apnea) 04/03/2017       Past medical, surgical, family, and social history was reviewed and updated in Epic chart by me today.     Medications and allergies reviewed and updated in Epic chart by me today.     ROS:  Pertinent positives listed above in HPI. All other systems have been reviewed and are negative.    PE:   /64 (BP Location: Left arm, Patient Position: Sitting, BP Cuff Size: Adult)   Pulse 91   Temp 36.8 °C (98.2 °F) (Temporal)   Resp 17   Ht 1.753 m (5' 9\")   Wt 90.9 kg (200 lb 6.4 oz)   SpO2 100%   Breastfeeding? No   BMI 29.59 kg/m²   Vital signs reviewed with patient.     Gen: Well developed; well nourished; no acute distress; tired appearance   HEENT: Normocephalic; atraumatic; PEERLA b/l; sclera clear b/l; b/l external auditory canals WNL; R TM is dull; L TM WNL; nares patent; oropharynx clear but posterior aspect is beefy red; oral mucosa moist; tongue midline; dentition adequate; congested; puffy under each eye   Neck: No adenopathy; no thyromegaly  CV: " Regular rate and rhythm; S1/ S2 present; no murmur, gallop or rub noted  Pulm: No respiratory distress; clear to ascultation b/l; no wheezing or stridor noted b/l  Abd: Adequate bowel sounds noted; soft and nontender; no rebound, rigidity, nor distention  Extremities: No peripheral edema b/l LE extremities/ no clubbing nor cyanosis noted  Skin: Warm and dry; no rashes noted   Neuro: No focal deficits noted   Psych: AAOx4; mood and affect are appropriate    A/P:  1. Other acute recurrent sinusitis  Patient has been ill since January 13 and has failed appropriate OTC medication use. Recommend patient start Z pack today (she prefers this over Augmentin), use plain Mucinex BID, Delsym PRN for cough control, maintain adequate hydration, rest, and follow clinical response. New RX sent to pharmacy, and patient is to contact our office if condition does not resolve with treatment plans detailed above.   - azithromycin (ZITHROMAX) 250 MG Tab; Take tablets by mouth as directed.  Dispense: 6 Tab; Refill: 0

## 2020-02-06 ENCOUNTER — HOSPITAL ENCOUNTER (OUTPATIENT)
Dept: LAB | Facility: MEDICAL CENTER | Age: 40
End: 2020-02-06
Payer: COMMERCIAL

## 2020-02-06 ENCOUNTER — NON-PROVIDER VISIT (OUTPATIENT)
Dept: INTERNAL MEDICINE | Facility: IMAGING CENTER | Age: 40
End: 2020-02-06
Payer: COMMERCIAL

## 2020-02-06 ENCOUNTER — HOSPITAL ENCOUNTER (OUTPATIENT)
Facility: MEDICAL CENTER | Age: 40
End: 2020-02-06
Attending: FAMILY MEDICINE
Payer: COMMERCIAL

## 2020-02-06 DIAGNOSIS — E78.2 MIXED DYSLIPIDEMIA: Chronic | ICD-10-CM

## 2020-02-06 LAB
CHOLEST SERPL-MCNC: 175 MG/DL (ref 100–199)
CK SERPL-CCNC: 93 U/L (ref 0–154)
ESTRADIOL SERPL-MCNC: 126 PG/ML
FSH SERPL-ACNC: 5.9 MIU/ML
HDLC SERPL-MCNC: 65 MG/DL
LDLC SERPL CALC-MCNC: 100 MG/DL
PROLACTIN SERPL-MCNC: 6.93 NG/ML (ref 2.8–26)
T4 SERPL-MCNC: 10.2 UG/DL (ref 4–12)
TRIGL SERPL-MCNC: 49 MG/DL (ref 0–149)
TSH SERPL DL<=0.005 MIU/L-ACNC: 0.9 UIU/ML (ref 0.38–5.33)

## 2020-02-06 PROCEDURE — 82670 ASSAY OF TOTAL ESTRADIOL: CPT

## 2020-02-06 PROCEDURE — 84436 ASSAY OF TOTAL THYROXINE: CPT

## 2020-02-06 PROCEDURE — 84146 ASSAY OF PROLACTIN: CPT

## 2020-02-06 PROCEDURE — 80061 LIPID PANEL: CPT

## 2020-02-06 PROCEDURE — 84443 ASSAY THYROID STIM HORMONE: CPT

## 2020-02-06 PROCEDURE — 82550 ASSAY OF CK (CPK): CPT

## 2020-02-06 PROCEDURE — 83001 ASSAY OF GONADOTROPIN (FSH): CPT

## 2020-02-25 ENCOUNTER — TELEPHONE (OUTPATIENT)
Dept: SLEEP MEDICINE | Facility: MEDICAL CENTER | Age: 40
End: 2020-02-25

## 2020-02-25 DIAGNOSIS — G47.33 OSA (OBSTRUCTIVE SLEEP APNEA): ICD-10-CM

## 2020-02-26 NOTE — TELEPHONE ENCOUNTER
[facility] LVM at the [facility] stating that she is in need of a new order for mask and supplies because the one that [facility] has is  and they are needing a new one before they can provide pt with supplies.     LVM for pt informing her that we received her VM and an order has been place and once it is sign I will call her to inform her it has been done.     Please sign pending order.     LOV: 18 - Chhaya PAUL  NOV: 2020 - Chhaya PAUL

## 2020-02-26 NOTE — TELEPHONE ENCOUNTER
LVM for the pt informing her that the order she requested for supplies has been signed and faxed to [facility].

## 2020-03-06 ENCOUNTER — SLEEP CENTER VISIT (OUTPATIENT)
Dept: SLEEP MEDICINE | Facility: MEDICAL CENTER | Age: 40
End: 2020-03-06
Payer: COMMERCIAL

## 2020-03-06 VITALS
HEIGHT: 69 IN | DIASTOLIC BLOOD PRESSURE: 78 MMHG | BODY MASS INDEX: 29.03 KG/M2 | RESPIRATION RATE: 14 BRPM | OXYGEN SATURATION: 96 % | SYSTOLIC BLOOD PRESSURE: 122 MMHG | WEIGHT: 196 LBS | HEART RATE: 82 BPM

## 2020-03-06 DIAGNOSIS — G47.33 OSA (OBSTRUCTIVE SLEEP APNEA): ICD-10-CM

## 2020-03-06 PROCEDURE — 99213 OFFICE O/P EST LOW 20 MIN: CPT | Performed by: FAMILY MEDICINE

## 2020-03-06 SDOH — HEALTH STABILITY: MENTAL HEALTH: HOW OFTEN DO YOU HAVE A DRINK CONTAINING ALCOHOL?: MONTHLY OR LESS

## 2020-03-06 ASSESSMENT — FIBROSIS 4 INDEX: FIB4 SCORE: 0.76

## 2020-03-06 NOTE — PROGRESS NOTES
[facility] Follow Up Note     Date: 3/6/2020 / Time: 9:11 AM    Patient ID:   Name:             Desire Mancini   YOB: 1980  Age:                 39 y.o.  female   MRN:               [id number]      Thank you for requesting a sleep medicine consultation on Desire Mancini at the sleep center. She presents today with the chief complaints of AMELIA follow up.     HISTORY OF PRESENT ILLNESS:       Pt is currently on ACPAP 5-9 cm.  She is getting about 7 hrs of sleep on a good night and about 5 hr of sleep on a bad night. The bad nights are due night time cough. Overall, she doesnot finds her sleep refreshing however she has not been using the CPAP.She is using CPAP most days of the week. Pt reports 2 hrs of average nightly use of CPAP. Pt denies snoring, gasping,choking.Pt also denies significant mask leak that is interfering with sleep. The 30 day compliance was downloaded which shows inadequate compliance with more that 4 hr usage about 33%. The AHI is has improved to 2.1/hr. The mask leak is normal. She continues have mask intolerance mostly due to dry mouth and cough.    SLEEP HISTORY   PSG from 5/2017 indicated a RDI of 9.1 and low oxygen of 91%.  Arousal index was 13.9.       REVIEW OF SYSTEMS:       Constitutional: Denies fevers, Denies weight changes  Eyes: Denies changes in vision, no eye pain  Ears/Nose/Throat/Mouth: Denies nasal congestion or sore throat   Cardiovascular: Denies chest pain or palpitations   Respiratory: Denies shortness of breath , Denies cough  Gastrointestinal/Hepatic: Denies abdominal pain,   Skin/Breast: Denies rash,   Neurological: Denies headache, confusion, memory loss or focal weakness/parasthesias  Psychiatric: denies mood disorder   Sleep: non restful sleep.     Comprehensive review of systems form is reviewed with the patient and is attached in the EMR.     PMH:  has a past medical history of Acne, Chickenpox, History of recurrent UTIs,  "Influenza, and Upper airway resistance syndrome. She also has no past medical history of Encounter for long-term (current) use of other medications.  MEDS:   Current Outpatient Medications:   •  Lactobacillus Tab, ACIDOPHILUS PROBIOTIC TABS, Disp: , Rfl:   •  Multiple Vitamin (DAILY VALUE MULTIVITAMIN) Tab, DAILY VALUE MULTIVITAMIN TABS, Disp: , Rfl:   •  rosuvastatin (CRESTOR) 10 MG Tab, Take 1 Tab by mouth every evening., Disp: 30 Tab, Rfl: 11  •  ammonium lactate (LAC-HYDRIN) 12 % Lotion, Apply to feet BID prn, Disp: 1 Bottle, Rfl: 2  •  azithromycin (ZITHROMAX) 250 MG Tab, Take tablets by mouth as directed. (Patient not taking: Reported on 3/6/2020), Disp: 6 Tab, Rfl: 0  •  azithromycin (ZITHROMAX) 250 MG Tab, Take tablets by mouth as directed. (Patient not taking: Reported on 3/6/2020), Disp: 6 Tab, Rfl: 0  •  LO LOESTRIN FE 1 MG-10 MCG / 10 MCG Tab, , Disp: , Rfl:   ALLERGIES:   Allergies   Allergen Reactions   • Eucalyptus Oil      SURGHX: No past surgical history on file.  SOCHX:  reports that she is a non-smoker but has been exposed to tobacco smoke. She has never used smokeless tobacco. She reports current alcohol use. She reports that she does not use drugs..  FH:   Family History   Problem Relation Age of Onset   • Hypertension Mother    • Sleep Apnea Father    • Heart Disease Maternal Grandmother    • Cancer Paternal Grandmother    • Breast Cancer Paternal Grandmother    • Cancer Paternal Grandfather    • Lung Cancer Paternal Grandfather          Physical Exam:  Vitals/ General Appearance:   Weight/BMI: Body mass index is 28.94 kg/m².  /78 (BP Location: Left arm, Patient Position: Sitting, BP Cuff Size: Adult)   Pulse 82   Resp 14   Ht 1.753 m (5' 9\")   Wt 88.9 kg (196 lb)   SpO2 96%   Vitals:    03/06/20 0858   BP: 122/78   BP Location: Left arm   Patient Position: Sitting   BP Cuff Size: Adult   Pulse: 82   Resp: 14   SpO2: 96%   Weight: 88.9 kg (196 lb)   Height: 1.753 m (5' 9\")       Pt. " is alert and oriented to time, place and person. Cooperative and in no apparent distress.       -Head: Atraumatic, normocephalic.   -. Throat: Oropharynx appears crowded in that the palate is overhanging    -. Neck: Supple. No thyromegaly  -. Chest: Trachea central  -. Lungs auscultation: B/L good air entry, vesicular breath sounds, no wheezing  -. Heart auscultation: 1st and 2nd heart sounds normal, regular rhythm. No appreciable murmur.  - Extremities:  no pedal edema.  - Skin: No rash  - NEUROLOGICAL EXAMINATION: On neurological exam, the patient was alert and oriented x3. speech was clear and fluent without dysarthria.      ASSESSMENT AND PLAN     1. Sleep Apnea     She is urged to avoid supine sleep, weight gain and alcoholic beverages since all of these can worsen sleep apnea. She is cautioned against drowsy driving. If She feels sleepy while driving, She must pull over for a break/nap, rather than persist on the road, in the interest of She own safety and that of others on the road.   Plan   - Continue ACPAP 5-9 cm    - mask fitting was done and medium F20 was given to try     - compliance download was reviewed and discussed with the pt   - compliance was reinforced     2. Regarding treatment of other past medical problems and general health maintenance,  She is urged to follow up with PCP.

## 2020-03-16 DIAGNOSIS — F51.01 PRIMARY INSOMNIA: ICD-10-CM

## 2020-03-16 DIAGNOSIS — F41.1 GAD (GENERALIZED ANXIETY DISORDER): ICD-10-CM

## 2020-03-16 RX ORDER — ALPRAZOLAM 0.25 MG/1
0.25 TABLET ORAL 2 TIMES DAILY PRN
Qty: 60 TAB | Refills: 2 | Status: SHIPPED
Start: 2020-03-16 | End: 2020-04-15

## 2020-03-17 ENCOUNTER — TELEPHONE (OUTPATIENT)
Dept: SLEEP MEDICINE | Facility: MEDICAL CENTER | Age: 40
End: 2020-03-17

## 2020-03-17 DIAGNOSIS — G47.33 OSA (OBSTRUCTIVE SLEEP APNEA): ICD-10-CM

## 2020-03-17 NOTE — TELEPHONE ENCOUNTER
----- Message from Dee Ly, Med Ass't sent at 3/17/2020  9:05 AM PDT -----  Regarding: FW: Non-Urgent Medical Question  Contact: [phone]    ----- Message -----  From: Desire Mancini  Sent: 3/16/2020   3:45 PM PDT  To: [facility]  Message Pool  Subject: Non-Urgent Medical Question                      Hi Dr. Jordan,    I am enjoying the full mask (as much as one can enjoy wearing a mask to sleep!) and I was wondering if you could send that Rx into [facility] so that they will send the full mask instead of the nasal pillow in my next shipment.    Thank you,  Eufemia

## 2020-05-27 ENCOUNTER — OFFICE VISIT (OUTPATIENT)
Dept: INTERNAL MEDICINE | Facility: IMAGING CENTER | Age: 40
End: 2020-05-27
Payer: COMMERCIAL

## 2020-05-27 VITALS
TEMPERATURE: 98.6 F | SYSTOLIC BLOOD PRESSURE: 105 MMHG | OXYGEN SATURATION: 96 % | HEIGHT: 69 IN | BODY MASS INDEX: 29.03 KG/M2 | DIASTOLIC BLOOD PRESSURE: 63 MMHG | WEIGHT: 195.99 LBS | HEART RATE: 74 BPM | RESPIRATION RATE: 17 BRPM

## 2020-05-27 DIAGNOSIS — F32.1 CURRENT MODERATE EPISODE OF MAJOR DEPRESSIVE DISORDER WITHOUT PRIOR EPISODE (HCC): ICD-10-CM

## 2020-05-27 DIAGNOSIS — E78.2 MIXED DYSLIPIDEMIA: Chronic | ICD-10-CM

## 2020-05-27 DIAGNOSIS — F41.1 GENERALIZED ANXIETY DISORDER: Chronic | ICD-10-CM

## 2020-05-27 PROBLEM — F32.9 MAJOR DEPRESSIVE DISORDER: Status: ACTIVE | Noted: 2020-05-27

## 2020-05-27 PROBLEM — F32.9 CURRENT EPISODE OF MAJOR DEPRESSIVE DISORDER WITHOUT PRIOR EPISODE: Status: ACTIVE | Noted: 2020-05-27

## 2020-05-27 PROBLEM — F32.9 CURRENT EPISODE OF MAJOR DEPRESSIVE DISORDER WITHOUT PRIOR EPISODE: Status: RESOLVED | Noted: 2020-05-27 | Resolved: 2020-05-27

## 2020-05-27 PROCEDURE — 99214 OFFICE O/P EST MOD 30 MIN: CPT | Performed by: FAMILY MEDICINE

## 2020-05-27 ASSESSMENT — PATIENT HEALTH QUESTIONNAIRE - PHQ9
5. POOR APPETITE OR OVEREATING: 3 - NEARLY EVERY DAY
SUM OF ALL RESPONSES TO PHQ QUESTIONS 1-9: 11
CLINICAL INTERPRETATION OF PHQ2 SCORE: 3

## 2020-05-27 ASSESSMENT — FIBROSIS 4 INDEX: FIB4 SCORE: 0.76

## 2020-05-28 NOTE — PROGRESS NOTES
"Chief Complaint   Patient presents with   • Depression     Discuss possibility of anti-depressant.        HPI:  Patient is a 39 y.o. female established patient who presents today to discuss new depression symptoms that started at the beginning of the COVID-19 crisis. She reports feeling lack of motivation, lack of sex drive, feeling the \"world is heavy\", increased agitation about doing daily tasks, and increased crying/emotional lability. 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 She reports suffering from similar symptoms in 2010 and after the birth of her son, but she did not have a formal depression diagnosis during either event. She is reaching out for assistance because she feels that the condition is not improving and the COVID-19 crisis is not ending anytime soon. She also reports struggles losing weight and has not been taking daily Crestor due to obesity concerns. She denies other new health concerns and is tearful throughout our visit today.     Patient Active Problem List    Diagnosis Date Noted   • Major depressive disorder 05/27/2020   • Primary insomnia 08/19/2019   • Cervical high risk human papillomavirus (HPV) DNA test positive 07/15/2019   • Vitamin D deficiency 07/12/2019   • Mixed dyslipidemia 08/22/2018   • TRACEY (generalized anxiety disorder) 11/10/2017   • History of recurrent UTI (urinary tract infection) 09/20/2017   • Acne vulgaris 09/20/2017   • Upper airway resistance syndrome 05/26/2017   • AMELIA (obstructive sleep apnea) 04/03/2017       Past medical, surgical, family, and social history was reviewed and updated in Epic chart by me today.     Medications and allergies reviewed and updated in Epic chart by me today.     ROS:  Pertinent positives listed above in HPI. All other systems have been reviewed and are negative.    PE:   /63 (BP Location: Left arm, Patient Position: Sitting, BP Cuff Size: Adult)   Pulse " "74   Temp 37 °C (98.6 °F) (Temporal)   Resp 17   Ht 1.753 m (5' 9\")   Wt 88.9 kg (195 lb 15.8 oz)   SpO2 96%   BMI 28.94 kg/m²   Vital signs reviewed with patient.     Gen: Well developed; well nourished; no acute distress; age appropriate appearance   Skin: Warm and dry; no rashes noted   Neuro: No focal deficits noted   Psych: AAOx4; mood and affect are tearful throughout our visit     A/P:  1. Current moderate episode of major depressive disorder without prior episode (HCC)  We discussed this diagnosis at length today, and I recommend patient start dailynt use  anti-depressant use and counseling. She is unable to participate in counseling at this time due to lack of  and being stay at home mom - she also feels that Zoom will not be effective due to interruptions from her young son. She has a supportive  who encouraged her to seek help today, and no overt safety concerns present. Recommend patient consider Lexapro, Wellbutrin XL, and Zoloft as initial medication choices. She will research and let me know by Friday which medication she would like to start taking daily. I also recommended that she refocus using Peloton bike and other exercise options on their pranay daily to help refocus her good energy. Encouraged patient to contact me 24/7 if she feels unsafe or needs to talk.   - Patient has been identified as having a positive depression screening. Appropriate orders and counseling have been given.    2. TRACEY (generalized anxiety disorder)  Ongoing issue coupled with new depression. Patient has low dose Xanax at home for PRN symptom control use.     3. Mixed dyslipidemia  Patient reports not taking daily Crestor since March due to obesity concerns. We reviewed risks versus benefits of cholesterol control, and recommend patient restart daily Crestor use.    Patient will follow up with me in 3-4 weeks for office visit and can contact me PRN in the interim.            "

## 2020-05-31 ENCOUNTER — PATIENT MESSAGE (OUTPATIENT)
Dept: INTERNAL MEDICINE | Facility: IMAGING CENTER | Age: 40
End: 2020-05-31

## 2020-06-27 DIAGNOSIS — E78.2 MIXED DYSLIPIDEMIA: Chronic | ICD-10-CM

## 2020-06-29 RX ORDER — ROSUVASTATIN CALCIUM 10 MG/1
TABLET, COATED ORAL
Qty: 90 TAB | Refills: 2 | Status: SHIPPED | OUTPATIENT
Start: 2020-06-29 | End: 2021-04-05

## 2020-07-21 ENCOUNTER — APPOINTMENT (OUTPATIENT)
Dept: SLEEP MEDICINE | Facility: MEDICAL CENTER | Age: 40
End: 2020-07-21
Payer: COMMERCIAL

## 2020-08-26 DIAGNOSIS — Z00.00 HEALTH CARE MAINTENANCE: ICD-10-CM

## 2020-08-26 DIAGNOSIS — E78.2 MIXED DYSLIPIDEMIA: ICD-10-CM

## 2020-08-26 DIAGNOSIS — E55.9 VITAMIN D DEFICIENCY: ICD-10-CM

## 2020-08-27 ENCOUNTER — NON-PROVIDER VISIT (OUTPATIENT)
Dept: INTERNAL MEDICINE | Facility: IMAGING CENTER | Age: 40
End: 2020-08-27
Payer: COMMERCIAL

## 2020-08-27 ENCOUNTER — HOSPITAL ENCOUNTER (OUTPATIENT)
Facility: MEDICAL CENTER | Age: 40
End: 2020-08-27
Attending: DERMATOLOGY
Payer: COMMERCIAL

## 2020-08-27 ENCOUNTER — HOSPITAL ENCOUNTER (OUTPATIENT)
Facility: MEDICAL CENTER | Age: 40
End: 2020-08-27
Attending: FAMILY MEDICINE
Payer: COMMERCIAL

## 2020-08-27 DIAGNOSIS — E78.2 MIXED DYSLIPIDEMIA: ICD-10-CM

## 2020-08-27 DIAGNOSIS — E55.9 VITAMIN D DEFICIENCY: ICD-10-CM

## 2020-08-27 DIAGNOSIS — Z00.00 HEALTH CARE MAINTENANCE: ICD-10-CM

## 2020-08-27 PROCEDURE — 83036 HEMOGLOBIN GLYCOSYLATED A1C: CPT

## 2020-08-27 PROCEDURE — 80053 COMPREHEN METABOLIC PANEL: CPT

## 2020-08-27 PROCEDURE — 82306 VITAMIN D 25 HYDROXY: CPT

## 2020-08-27 PROCEDURE — 80061 LIPID PANEL: CPT

## 2020-08-27 PROCEDURE — 85025 COMPLETE CBC W/AUTO DIFF WBC: CPT

## 2020-08-27 PROCEDURE — 84443 ASSAY THYROID STIM HORMONE: CPT

## 2020-08-27 PROCEDURE — 84132 ASSAY OF SERUM POTASSIUM: CPT

## 2020-08-28 LAB
25(OH)D3 SERPL-MCNC: 51 NG/ML (ref 30–100)
ALBUMIN SERPL BCP-MCNC: 4.5 G/DL (ref 3.2–4.9)
ALBUMIN/GLOB SERPL: 2 G/DL
ALP SERPL-CCNC: 64 U/L (ref 30–99)
ALT SERPL-CCNC: 17 U/L (ref 2–50)
ANION GAP SERPL CALC-SCNC: 12 MMOL/L (ref 7–16)
AST SERPL-CCNC: 20 U/L (ref 12–45)
BASOPHILS # BLD AUTO: 0.8 % (ref 0–1.8)
BASOPHILS # BLD: 0.05 K/UL (ref 0–0.12)
BILIRUB SERPL-MCNC: 0.7 MG/DL (ref 0.1–1.5)
BUN SERPL-MCNC: 9 MG/DL (ref 8–22)
CALCIUM SERPL-MCNC: 9.5 MG/DL (ref 8.5–10.5)
CHLORIDE SERPL-SCNC: 102 MMOL/L (ref 96–112)
CHOLEST SERPL-MCNC: 128 MG/DL (ref 100–199)
CO2 SERPL-SCNC: 24 MMOL/L (ref 20–33)
CREAT SERPL-MCNC: 0.67 MG/DL (ref 0.5–1.4)
EOSINOPHIL # BLD AUTO: 0.06 K/UL (ref 0–0.51)
EOSINOPHIL NFR BLD: 0.9 % (ref 0–6.9)
ERYTHROCYTE [DISTWIDTH] IN BLOOD BY AUTOMATED COUNT: 46.8 FL (ref 35.9–50)
EST. AVERAGE GLUCOSE BLD GHB EST-MCNC: 100 MG/DL
GLOBULIN SER CALC-MCNC: 2.3 G/DL (ref 1.9–3.5)
GLUCOSE SERPL-MCNC: 88 MG/DL (ref 65–99)
HBA1C MFR BLD: 5.1 % (ref 0–5.6)
HCT VFR BLD AUTO: 47.5 % (ref 37–47)
HDLC SERPL-MCNC: 70 MG/DL
HGB BLD-MCNC: 15.3 G/DL (ref 12–16)
IMM GRANULOCYTES # BLD AUTO: 0.02 K/UL (ref 0–0.11)
IMM GRANULOCYTES NFR BLD AUTO: 0.3 % (ref 0–0.9)
LDLC SERPL CALC-MCNC: 45 MG/DL
LYMPHOCYTES # BLD AUTO: 1.99 K/UL (ref 1–4.8)
LYMPHOCYTES NFR BLD: 30.9 % (ref 22–41)
MCH RBC QN AUTO: 32.7 PG (ref 27–33)
MCHC RBC AUTO-ENTMCNC: 32.2 G/DL (ref 33.6–35)
MCV RBC AUTO: 101.5 FL (ref 81.4–97.8)
MONOCYTES # BLD AUTO: 0.48 K/UL (ref 0–0.85)
MONOCYTES NFR BLD AUTO: 7.4 % (ref 0–13.4)
NEUTROPHILS # BLD AUTO: 3.85 K/UL (ref 2–7.15)
NEUTROPHILS NFR BLD: 59.7 % (ref 44–72)
NRBC # BLD AUTO: 0 K/UL
NRBC BLD-RTO: 0 /100 WBC
PLATELET # BLD AUTO: 255 K/UL (ref 164–446)
PMV BLD AUTO: 11.3 FL (ref 9–12.9)
POTASSIUM SERPL-SCNC: 4 MMOL/L (ref 3.6–5.5)
POTASSIUM SERPL-SCNC: 4.1 MMOL/L (ref 3.6–5.5)
PROT SERPL-MCNC: 6.8 G/DL (ref 6–8.2)
RBC # BLD AUTO: 4.68 M/UL (ref 4.2–5.4)
SODIUM SERPL-SCNC: 138 MMOL/L (ref 135–145)
TRIGL SERPL-MCNC: 66 MG/DL (ref 0–149)
TSH SERPL DL<=0.005 MIU/L-ACNC: 1.6 UIU/ML (ref 0.38–5.33)
WBC # BLD AUTO: 6.5 K/UL (ref 4.8–10.8)

## 2020-09-03 ENCOUNTER — OFFICE VISIT (OUTPATIENT)
Dept: INTERNAL MEDICINE | Facility: IMAGING CENTER | Age: 40
End: 2020-09-03
Payer: COMMERCIAL

## 2020-09-03 VITALS
OXYGEN SATURATION: 97 % | SYSTOLIC BLOOD PRESSURE: 120 MMHG | DIASTOLIC BLOOD PRESSURE: 70 MMHG | WEIGHT: 204 LBS | RESPIRATION RATE: 16 BRPM | TEMPERATURE: 97.9 F | HEIGHT: 69 IN | BODY MASS INDEX: 30.21 KG/M2 | HEART RATE: 66 BPM

## 2020-09-03 DIAGNOSIS — Z71.85 VACCINE COUNSELING: ICD-10-CM

## 2020-09-03 DIAGNOSIS — L70.0 ACNE VULGARIS: Chronic | ICD-10-CM

## 2020-09-03 DIAGNOSIS — F51.01 PRIMARY INSOMNIA: ICD-10-CM

## 2020-09-03 DIAGNOSIS — G47.33 OSA (OBSTRUCTIVE SLEEP APNEA): ICD-10-CM

## 2020-09-03 DIAGNOSIS — E66.9 OBESITY (BMI 30-39.9): ICD-10-CM

## 2020-09-03 DIAGNOSIS — Z12.31 ENCOUNTER FOR SCREENING MAMMOGRAM FOR BREAST CANCER: ICD-10-CM

## 2020-09-03 DIAGNOSIS — Z00.00 WELLNESS EXAMINATION: ICD-10-CM

## 2020-09-03 DIAGNOSIS — E55.9 VITAMIN D DEFICIENCY: ICD-10-CM

## 2020-09-03 DIAGNOSIS — E78.2 MIXED DYSLIPIDEMIA: Chronic | ICD-10-CM

## 2020-09-03 DIAGNOSIS — F41.1 GENERALIZED ANXIETY DISORDER: Chronic | ICD-10-CM

## 2020-09-03 DIAGNOSIS — F32.1 CURRENT MODERATE EPISODE OF MAJOR DEPRESSIVE DISORDER WITHOUT PRIOR EPISODE (HCC): ICD-10-CM

## 2020-09-03 PROCEDURE — 99395 PREV VISIT EST AGE 18-39: CPT | Performed by: FAMILY MEDICINE

## 2020-09-03 RX ORDER — MAGNESIUM 200 MG
1000 TABLET ORAL EVERY EVENING
COMMUNITY

## 2020-09-03 RX ORDER — SPIRONOLACTONE 50 MG/1
50 TABLET, FILM COATED ORAL 2 TIMES DAILY
COMMUNITY
Start: 2020-08-24

## 2020-09-03 RX ORDER — DOXYCYCLINE HYCLATE 50 MG/1
50 CAPSULE ORAL DAILY
COMMUNITY
Start: 2020-08-24 | End: 2022-01-27

## 2020-09-03 SDOH — HEALTH STABILITY: MENTAL HEALTH: HOW OFTEN DO YOU HAVE A DRINK CONTAINING ALCOHOL?: 4 OR MORE TIMES A WEEK

## 2020-09-03 ASSESSMENT — FIBROSIS 4 INDEX: FIB4 SCORE: 0.74

## 2020-09-03 NOTE — PROGRESS NOTES
"Chief Complaint   Patient presents with   • Annual Exam       HPI:  Patient is a 39 y.o. female established patient who presents today for her annual wellness visit and to review labs done 8/27/2020. She reports that her chronic depressive disorder has improved over the past three months, and she feels well without daily need for anti-depressants at this time. She is UTD with her GYN exam with Dr. Calderon and has chronic TRACEY well controlled with very infrequent Xanax use. She has chronic mixed dyslipidemia with nightly Crestor use, and chronic AMELIA/ upper airway resistance syndrome with qhs autoCPAP use. She continues to struggle with chronic obesity and is seeking new options for weight loss. She will be due for screening mammogram (40th birthday [birth year]) and denies other new health concerns. She is in good spirits and remains busy with her family.     Patient Active Problem List    Diagnosis Date Noted   • Obesity (BMI 30-39.9) 09/03/2020   • Major depressive disorder 05/27/2020   • Primary insomnia 08/19/2019   • Cervical high risk human papillomavirus (HPV) DNA test positive 07/15/2019   • Vitamin D deficiency 07/12/2019   • Mixed dyslipidemia 08/22/2018   • TRACEY (generalized anxiety disorder) 11/10/2017   • History of recurrent UTI (urinary tract infection) 09/20/2017   • Acne vulgaris 09/20/2017   • Upper airway resistance syndrome 05/26/2017   • AMELIA (obstructive sleep apnea) 04/03/2017       Past medical, surgical, family, and social history was reviewed and updated in Epic chart by me today.     Medications and allergies reviewed and updated in Epic chart by me today.     Lab results 8/27/2020 reviewed with patient at visit today.     ROS:  Pertinent positives listed above in HPI. All other systems have been reviewed and are negative.    PE:   /70 (BP Location: Left arm, Patient Position: Sitting, BP Cuff Size: Adult)   Pulse 66   Temp 36.6 °C (97.9 °F) (Temporal)   Resp 16   Ht 1.753 m (5' 9\") "   Wt 92.5 kg (204 lb)   SpO2 97%   BMI 30.13 kg/m²   Vital signs reviewed with patient.     Gen: Well developed; well nourished; no acute distress; age appropriate appearance   HEENT: Normocephalic; atraumatic; PEERLA b/l; sclera clear b/l; b/l external auditory canals WNL; b/l TM WNL; nares patent; oropharynx clear; mask in place  Neck: No adenopathy; no thyromegaly  CV: Regular rate and rhythm; S1/ S2 present; no murmur, gallop or rub noted  Pulm: No respiratory distress; clear to ascultation b/l; no wheezing or stridor noted b/l  Abd: Adequate bowel sounds noted; soft and nontender; no rebound, rigidity, nor distention  Extremities: No peripheral edema b/l LE extremities/ no clubbing nor cyanosis noted  Skin: Warm and dry; no rashes noted   Neuro: No focal deficits noted   Psych: AAOx4; mood and affect are appropriate    A/P:  1. Acne vulgaris  Patient has recently started Doxycycline and Spironolactone per her dermatology team for ongoing acne management.     2. TRACEY (generalized anxiety disorder)  Stable at this time/ patient has Xanax at home for PRN use.     3. Mixed dyslipidemia  Markedly improved since patient has restarted nightly Crestor use. Recommend patient continue current Crestor use, continue healthy diet choices, and continue intentional weight loss efforts.     4. Current moderate episode of major depressive disorder without prior episode (HCC)  Improved over the past three months without need for daily medication use. Patient feels better overall and no safety concerns present.     5. Primary insomnia  Not a major concern at this time.     6. Vitamin D deficiency  Improved/ recommend patient continue current Vitamin D supplementation.     7. AMELIA (obstructive sleep apnea)  Stable/ managed with autoCPAP qhs by [facility]    8. Obesity (BMI 30-39.9)  Ongoing issue for patient. We discussed increasing exercise consistency and trying [employer] weight loss program. Will follow.   - Patient  identified as having weight management issue.  Appropriate orders and counseling given.    9. Vaccine counseling  Patient will obtain flu shot when available at clinic.     10. Wellness examination  Patient is stable at this time and is well informed about ongoing health issues that need additional attention moving forward.     11. Encounter for screening mammogram for breast cancer  Patient turns 40 [birth year] and will be eligible for annual mammogram imaging. Patient provided with number to call to schedule imaging exam.   - MA-SCREENING MAMMO BILAT W/TOMOSYNTHESIS W/CAD; Future

## 2020-09-30 ENCOUNTER — NON-PROVIDER VISIT (OUTPATIENT)
Dept: INTERNAL MEDICINE | Facility: IMAGING CENTER | Age: 40
End: 2020-09-30
Payer: COMMERCIAL

## 2020-09-30 DIAGNOSIS — Z23 NEED FOR VACCINATION: ICD-10-CM

## 2020-09-30 PROCEDURE — 90471 IMMUNIZATION ADMIN: CPT | Performed by: FAMILY MEDICINE

## 2020-09-30 PROCEDURE — 90686 IIV4 VACC NO PRSV 0.5 ML IM: CPT | Performed by: FAMILY MEDICINE

## 2020-10-19 ENCOUNTER — HOSPITAL ENCOUNTER (OUTPATIENT)
Dept: RADIOLOGY | Facility: MEDICAL CENTER | Age: 40
End: 2020-10-19
Attending: FAMILY MEDICINE
Payer: COMMERCIAL

## 2020-10-19 DIAGNOSIS — Z12.31 ENCOUNTER FOR SCREENING MAMMOGRAM FOR BREAST CANCER: ICD-10-CM

## 2020-10-19 PROCEDURE — 77067 SCR MAMMO BI INCL CAD: CPT

## 2020-12-28 ENCOUNTER — OFFICE VISIT (OUTPATIENT)
Dept: INTERNAL MEDICINE | Facility: IMAGING CENTER | Age: 40
End: 2020-12-28
Payer: COMMERCIAL

## 2020-12-28 VITALS
OXYGEN SATURATION: 97 % | HEIGHT: 69 IN | DIASTOLIC BLOOD PRESSURE: 62 MMHG | BODY MASS INDEX: 30.21 KG/M2 | RESPIRATION RATE: 17 BRPM | SYSTOLIC BLOOD PRESSURE: 111 MMHG | HEART RATE: 90 BPM | WEIGHT: 204 LBS | TEMPERATURE: 97.9 F

## 2020-12-28 DIAGNOSIS — H93.8X3 EAR FULLNESS, BILATERAL: ICD-10-CM

## 2020-12-28 PROCEDURE — 99213 OFFICE O/P EST LOW 20 MIN: CPT | Performed by: FAMILY MEDICINE

## 2020-12-28 ASSESSMENT — FIBROSIS 4 INDEX: FIB4 SCORE: 0.76

## 2020-12-28 NOTE — PROGRESS NOTES
"Chief Complaint   Patient presents with   • Ear Pain     bilateral ear pain and blood noted on qtip yesterday       HPI:  Patient is a 40 y.o. female established patient who presents today for evaluation of bilateral ear canal fullness and blood noted on qtip during routine home ear cleaning yesterday. Patient reports feeling canal fullness over the past few days to weeks without associated pain/acute illness/ear drainage nor hearing changes and wears custom made silicone ear plugs nightly for sleeping ( snores loudly). She became concerned when she saw blood on qtip after vigorous canal cleaning yesterday and is also concerned about ear wax build up.     Patient Active Problem List    Diagnosis Date Noted   • Obesity (BMI 30-39.9) 09/03/2020   • Major depressive disorder 05/27/2020   • Primary insomnia 08/19/2019   • Cervical high risk human papillomavirus (HPV) DNA test positive 07/15/2019   • Vitamin D deficiency 07/12/2019   • Mixed dyslipidemia 08/22/2018   • TRACEY (generalized anxiety disorder) 11/10/2017   • History of recurrent UTI (urinary tract infection) 09/20/2017   • Acne vulgaris 09/20/2017   • Upper airway resistance syndrome 05/26/2017   • AMELIA (obstructive sleep apnea) 04/03/2017       Past medical, surgical, family, and social history was reviewed and updated in Epic chart by me today.     Medications and allergies reviewed and updated in Epic chart by me today.     ROS:  Pertinent positives listed above in HPI. All other systems have been reviewed and are negative.    PE:   /62 (BP Location: Left arm, Patient Position: Sitting, BP Cuff Size: Adult)   Pulse 90   Temp 36.6 °C (97.9 °F) (Temporal)   Resp 17   Ht 1.753 m (5' 9\")   Wt 92.5 kg (204 lb)   SpO2 97%   BMI 30.13 kg/m²   Vital signs reviewed with patient.     Gen: Well developed; well nourished; no acute distress; non toxic appearance   HEENT: Normocephalic; atraumatic; PEERLA b/l; sclera clear b/l; b/l external auditory " canals WNL; b/l TM WNL; no blood nor scabbing noted in either canal; no cerumen b/l; nares patent; oropharynx clear; mask in place  Neck: No adenopathy; no thyromegaly  CV: Regular rate and rhythm; S1/ S2 present; no murmur, gallop or rub noted  Pulm: No respiratory distress; clear to ascultation b/l; no wheezing or stridor noted b/l  Abd: Adequate bowel sounds noted; soft and nontender; no rebound, rigidity, nor distention; no hepatosplenogmegaly   Extremities: No peripheral edema b/l LE extremities/ no clubbing nor cyanosis noted  Skin: Warm and dry; no rashes noted   Neuro: No focal deficits noted   Psych: AAOx4; mood and affect are appropriate    A/P:  1. Ear fullness, bilateral  No signs of acute infection nor canal trauma present on exam today. Canals are completely clear of cerumen and blood, and I feel that silicone ear plugs may be source of irritation. We discussed switching to soft foam ear plugs and using hydrogen peroxide in canals with cotton balls/ refrain from qtip use.     Face to face time: 15 minutes spent with greater than 50% of time spent with direct patient care and counseling about diagnosis, prognosis, risk versus benefits of treatment, and importance of compliance with instructions. All questions answered and support provided during visit today.

## 2021-02-11 DIAGNOSIS — F32.1 CURRENT MODERATE EPISODE OF MAJOR DEPRESSIVE DISORDER WITHOUT PRIOR EPISODE (HCC): ICD-10-CM

## 2021-02-19 ENCOUNTER — TELEMEDICINE (OUTPATIENT)
Dept: INTERNAL MEDICINE | Facility: IMAGING CENTER | Age: 41
End: 2021-02-19
Payer: COMMERCIAL

## 2021-02-19 DIAGNOSIS — F32.1 CURRENT MODERATE EPISODE OF MAJOR DEPRESSIVE DISORDER WITHOUT PRIOR EPISODE (HCC): ICD-10-CM

## 2021-02-19 PROCEDURE — 99213 OFFICE O/P EST LOW 20 MIN: CPT | Mod: 95,CR | Performed by: FAMILY MEDICINE

## 2021-02-19 NOTE — PROGRESS NOTES
Virtual Visit: Established Patient   This visit was conducted via Zoom using secure and encrypted videoconferencing technology. The patient was in a private location in the state of [place].    The patient's identity was confirmed and verbal consent was obtained for this virtual visit.    Subjective:     Chief Complaint   Patient presents with   • Medication Management     Zoloft       HPI:  Patient is a 40 y.o. female established patient who presents today to discuss current Zoloft use (started taking medication one week ago) for depression management. After much consideration, patient decided to take Zoloft for symptom management, and I prescribed her 50 mg tab, per her wishes of wanting to start with lowest dose available. She reports cutting tabs in half and is only taking one half tab daily - she initially took tab after breakfast and felt rolling nausea symptoms so she tried taking it in the evening. She reports having resultant insomnia and is seeking further options for use. She denies safety concerns and is looking forward to seeing her parents this weekend.     Patient Active Problem List    Diagnosis Date Noted   • Obesity (BMI 30-39.9) 09/03/2020   • Major depressive disorder 05/27/2020   • Primary insomnia 08/19/2019   • Cervical high risk human papillomavirus (HPV) DNA test positive 07/15/2019   • Vitamin D deficiency 07/12/2019   • Mixed dyslipidemia 08/22/2018   • TRACEY (generalized anxiety disorder) 11/10/2017   • History of recurrent UTI (urinary tract infection) 09/20/2017   • Acne vulgaris 09/20/2017   • Upper airway resistance syndrome 05/26/2017   • AMELIA (obstructive sleep apnea) 04/03/2017       Past medical, surgical, family, and social history was reviewed and updated in Epic chart by me today.     Medications and allergies reviewed and updated in Epic chart by me today.     ROS:  Pertinent positives listed above in HPI. All other systems have been reviewed and are negative.     Objective:    Patient unable to provide me with vital signs during visit today.     Physical Exam:  Constitutional: Alert, no distress, well-groomed.  Skin: No rashes in visible areas.  Eye: Round. Conjunctiva clear, lids normal. No icterus.   ENMT: Lips pink without lesions, good dentition, moist mucous membranes. Phonation normal.  Neck: Moves freely without pain.  Respiratory: Unlabored respiratory effort, no cough or audible wheeze  Psych: Alert and oriented x3, normal/calm affect and mood.     Assessment and Plan:   The following treatment plan was discussed:     1. Current moderate episode of major depressive disorder without prior episode (HCC)  Patient initiated Zoloft use one week ago and is currently taking half of prescribed dose. She has experienced some nausea and insomnia after medication use and has been hesitant to take any medication for this condition. We discussed need for patient to fully commit to this process (benefits far outweigh risks) and start taking Zoloft 50 mg daily as prescribed. Recommend patient take it before eating breakfast and see how she does over the weekend. If she continues to experience intolerance issues, will switch medication for her. No safety concern present today.       Follow-up: PRN

## 2021-02-28 ENCOUNTER — HOSPITAL ENCOUNTER (OUTPATIENT)
Facility: MEDICAL CENTER | Age: 41
End: 2021-02-28
Attending: NURSE PRACTITIONER
Payer: COMMERCIAL

## 2021-02-28 ENCOUNTER — OFFICE VISIT (OUTPATIENT)
Dept: URGENT CARE | Facility: CLINIC | Age: 41
End: 2021-02-28
Payer: COMMERCIAL

## 2021-02-28 VITALS
DIASTOLIC BLOOD PRESSURE: 70 MMHG | WEIGHT: 196.4 LBS | BODY MASS INDEX: 29.09 KG/M2 | RESPIRATION RATE: 16 BRPM | OXYGEN SATURATION: 99 % | TEMPERATURE: 97.2 F | HEART RATE: 68 BPM | SYSTOLIC BLOOD PRESSURE: 110 MMHG | HEIGHT: 69 IN

## 2021-02-28 DIAGNOSIS — R30.0 DYSURIA: ICD-10-CM

## 2021-02-28 LAB

## 2021-02-28 PROCEDURE — 81025 URINE PREGNANCY TEST: CPT | Performed by: NURSE PRACTITIONER

## 2021-02-28 PROCEDURE — 87086 URINE CULTURE/COLONY COUNT: CPT

## 2021-02-28 PROCEDURE — 81002 URINALYSIS NONAUTO W/O SCOPE: CPT | Performed by: NURSE PRACTITIONER

## 2021-02-28 PROCEDURE — 99213 OFFICE O/P EST LOW 20 MIN: CPT | Performed by: NURSE PRACTITIONER

## 2021-02-28 ASSESSMENT — ENCOUNTER SYMPTOMS
VOMITING: 0
NECK PAIN: 0
FEVER: 0
FLANK PAIN: 0
NAUSEA: 0
CHILLS: 0

## 2021-02-28 ASSESSMENT — FIBROSIS 4 INDEX: FIB4 SCORE: 0.76

## 2021-02-28 NOTE — PROGRESS NOTES
Subjective:      Eufemia Mancini is a 40 y.o. female who presents with Urinary Pain (xtoday, urinary pain and frequency)    Past Medical History:   Diagnosis Date   • Acne    • Chickenpox    • History of recurrent UTIs    • Influenza    • Upper airway resistance syndrome      Social History     Socioeconomic History   • Marital status:      Spouse name: Not on file   • Number of children: Not on file   • Years of education: Not on file   • Highest education level: Not on file   Occupational History   • Not on file   Tobacco Use   • Smoking status: Passive Smoke Exposure - Never Smoker   • Smokeless tobacco: Never Used   Substance and Sexual Activity   • Alcohol use: Yes     Comment: 1 glass of wine most evenings   • Drug use: No   • Sexual activity: Yes     Partners: Male     Birth control/protection: Surgical     Comment:  had vasectomy   Other Topics Concern   • Not on file   Social History Narrative   • Not on file     Social Determinants of Health     Financial Resource Strain:    • Difficulty of Paying Living Expenses:    Food Insecurity:    • Worried About Running Out of Food in the Last Year:    • Ran Out of Food in the Last Year:    Transportation Needs:    • Lack of Transportation (Medical):    • Lack of Transportation (Non-Medical):    Physical Activity:    • Days of Exercise per Week:    • Minutes of Exercise per Session:    Stress:    • Feeling of Stress :    Social Connections:    • Frequency of Communication with Friends and Family:    • Frequency of Social Gatherings with Friends and Family:    • Attends Confucianism Services:    • Active Member of Clubs or Organizations:    • Attends Club or Organization Meetings:    • Marital Status:    Intimate Partner Violence:    • Fear of Current or Ex-Partner:    • Emotionally Abused:    • Physically Abused:    • Sexually Abused:      Family History   Problem Relation Age of Onset   • Hypertension Mother    • Sleep Apnea Father    • Heart Disease  "Maternal Grandmother    • Cancer Paternal Grandmother    • Breast Cancer Paternal Grandmother    • Cancer Paternal Grandfather    • Lung Cancer Paternal Grandfather        Allergies: Eucalyptus oil    Patient is a 40-year-old female who presents today with complaint of mild dysuria and frequency.  Symptoms started over the last 24 hours.  Patient states she has had a history previously of recurrent urinary tract infections.  She has had to see urologist for consult before.  States her last urology consult was about 10 years ago.  Patient denies any lower back pain, denies fever, aches, or chills.            Dysuria   This is a new problem. The current episode started in the past 7 days. The problem occurs every urination. The problem has been unchanged. The quality of the pain is described as burning and aching. The pain is mild. There has been no fever. Associated symptoms include frequency and urgency. Pertinent negatives include no chills, flank pain, hematuria, nausea or vomiting. She has tried nothing for the symptoms. The treatment provided no relief.       Review of Systems   Constitutional: Negative for chills, fever and malaise/fatigue.   Gastrointestinal: Negative for nausea and vomiting.   Genitourinary: Positive for dysuria, frequency and urgency. Negative for flank pain and hematuria.   Musculoskeletal: Negative for neck pain.   All other systems reviewed and are negative.         Objective:     /70 (BP Location: Left arm, Patient Position: Sitting, BP Cuff Size: Adult)   Pulse 68   Temp 36.2 °C (97.2 °F) (Temporal)   Resp 16   Ht 1.753 m (5' 9\")   Wt 89.1 kg (196 lb 6.4 oz)   SpO2 99%   BMI 29.00 kg/m²      Physical Exam  Vitals reviewed.   Cardiovascular:      Rate and Rhythm: Normal rate and regular rhythm.      Heart sounds: Normal heart sounds.   Pulmonary:      Effort: Pulmonary effort is normal.      Breath sounds: Normal breath sounds.   Abdominal:      General: Abdomen is flat.     "  Tenderness: There is abdominal tenderness. There is no right CVA tenderness, left CVA tenderness, guarding or rebound.      Comments: Mild suprapubic tenderness, no CVA tenderness   Musculoskeletal:         General: Normal range of motion.      Cervical back: Normal range of motion and neck supple.   Skin:     General: Skin is warm and dry.      Capillary Refill: Capillary refill takes less than 2 seconds.   Neurological:      Mental Status: She is alert and oriented to person, place, and time.   Psychiatric:         Mood and Affect: Mood normal.         Behavior: Behavior normal.         Thought Content: Thought content normal.         Judgment: Judgment normal.     UA: negative blood, negative leukocytes, negative nitrates    Urine hCG: negative             Assessment/Plan:        1. Dysuria    - POCT Urinalysis  - POCT Pregnancy  - URINE CULTURE(NEW); Future  -macrobid  -Push fluids  -Strict ER precautions for developing flank pain, fever greater than 100.5, nausea/vomiting, or flulike symptoms.  -consider referral to urology for negative culture and persistent symptoms.

## 2021-03-02 LAB
BACTERIA UR CULT: NORMAL
SIGNIFICANT IND 70042: NORMAL
SITE SITE: NORMAL
SOURCE SOURCE: NORMAL

## 2021-03-03 ENCOUNTER — TELEPHONE (OUTPATIENT)
Dept: URGENT CARE | Facility: CLINIC | Age: 41
End: 2021-03-03

## 2021-03-03 NOTE — TELEPHONE ENCOUNTER
Patient notified by phone of negative urine culture.  States her symptoms have improved; she did not take the macrobid. Advised her to monitor for recurrent symptoms and follow up either with us or her PCP if this happens.  Advised also that if symptoms are recurrent and she would like a urology referral for consult, that I would be able to provide that also.  Patient verbalized understanding and agreement with plan of care. No further questions at this time.

## 2021-03-10 ENCOUNTER — TELEMEDICINE (OUTPATIENT)
Dept: INTERNAL MEDICINE | Facility: IMAGING CENTER | Age: 41
End: 2021-03-10
Payer: COMMERCIAL

## 2021-03-10 DIAGNOSIS — Z87.440 HISTORY OF RECURRENT UTI (URINARY TRACT INFECTION): Chronic | ICD-10-CM

## 2021-03-10 DIAGNOSIS — F32.1 CURRENT MODERATE EPISODE OF MAJOR DEPRESSIVE DISORDER WITHOUT PRIOR EPISODE (HCC): ICD-10-CM

## 2021-03-10 DIAGNOSIS — R39.15 URINARY URGENCY: ICD-10-CM

## 2021-03-10 PROCEDURE — 99214 OFFICE O/P EST MOD 30 MIN: CPT | Mod: 95,CR | Performed by: FAMILY MEDICINE

## 2021-03-12 NOTE — PROGRESS NOTES
"Virtual Visit: Established Patient   This visit was conducted via Zoom using secure and encrypted videoconferencing technology. The patient was in a private location in the state of [place].    The patient's identity was confirmed and verbal consent was obtained for this virtual visit.    Subjective:     Chief Complaint   Patient presents with   • Depression     follow-up visit       HPI:  Patient is a 40 y.o. female established patient who presents today for a follow-up visit regarding major depressive disorder. Eufemia has been taking Zoloft 50 daily for the past few weeks and feels much better (\"more herself\") with ongoing medication use. She reports that nausea issues have resolved over time, denies safety concerns and endorses 100% medication compliance. She also has a history of recurrent UTI and ongoing urinary urgency that interrupts her daily life. She is seeking additional options for further work up to prevent future infections. She is very bright and interactive throughout our visit today.     Patient Active Problem List    Diagnosis Date Noted   • Obesity (BMI 30-39.9) 09/03/2020   • Major depressive disorder 05/27/2020   • Primary insomnia 08/19/2019   • Cervical high risk human papillomavirus (HPV) DNA test positive 07/15/2019   • Vitamin D deficiency 07/12/2019   • Mixed dyslipidemia 08/22/2018   • TRACEY (generalized anxiety disorder) 11/10/2017   • History of recurrent UTI (urinary tract infection) 09/20/2017   • Acne vulgaris 09/20/2017   • Upper airway resistance syndrome 05/26/2017   • AMELIA (obstructive sleep apnea) 04/03/2017       Past medical, surgical, family, and social history was reviewed and updated in Epic chart by me today.     Medications and allergies reviewed and updated in Epic chart by me today.     ROS:  Pertinent positives listed above in HPI. All other systems have been reviewed and are negative.     Objective:   Patient unable to provide me with vital signs during visit today. "     Physical Exam:  Constitutional: Alert, no distress, well-groomed.  Skin: No rashes in visible areas.  Eye: Round. Conjunctiva clear, lids normal. No icterus.   ENMT: Lips pink without lesions, good dentition, moist mucous membranes. Phonation normal.  Neck: Moves freely without pain.  Respiratory: Unlabored respiratory effort, no cough or audible wheeze  Psych: Alert and oriented x3, happy affect and mood.     Assessment and Plan:   The following treatment plan was discussed:     A/P:  1. Current moderate episode of major depressive disorder without prior episode (HCC)  Markedly improved with ongoing Zoloft 50 mg daily use. Recommend patient continue daily Zoloft use for the next six months and then reevaluate her mental health situation at that time. Ultimate goal would be to wean off medication as able. New RX sent to pharmacy.   - sertraline (ZOLOFT) 50 MG Tab; Take 1 tablet by mouth every day.  Dispense: 90 tablet; Refill: 3    2. History of recurrent UTI (urinary tract infection)/ Urinary urgency  Ongoing issue for patient - will refer patient to [facility] for specialty work up. Patient provided with office number for scheduling purposes.   - REFERRAL TO UROLOGY    Follow-up: PRN

## 2021-03-16 ENCOUNTER — HOSPITAL ENCOUNTER (OUTPATIENT)
Facility: MEDICAL CENTER | Age: 41
End: 2021-03-16
Attending: DERMATOLOGY
Payer: COMMERCIAL

## 2021-03-16 ENCOUNTER — NON-PROVIDER VISIT (OUTPATIENT)
Dept: INTERNAL MEDICINE | Facility: IMAGING CENTER | Age: 41
End: 2021-03-16
Payer: COMMERCIAL

## 2021-03-16 LAB — POTASSIUM SERPL-SCNC: 4 MMOL/L (ref 3.6–5.5)

## 2021-03-16 PROCEDURE — 84132 ASSAY OF SERUM POTASSIUM: CPT

## 2021-03-16 PROCEDURE — 99999 PR NO CHARGE: CPT | Performed by: INTERNAL MEDICINE

## 2021-04-12 ENCOUNTER — HOSPITAL ENCOUNTER (OUTPATIENT)
Facility: MEDICAL CENTER | Age: 41
End: 2021-04-12
Attending: PHYSICIAN ASSISTANT
Payer: COMMERCIAL

## 2021-04-12 ENCOUNTER — NON-PROVIDER VISIT (OUTPATIENT)
Dept: INTERNAL MEDICINE | Facility: IMAGING CENTER | Age: 41
End: 2021-04-12
Payer: COMMERCIAL

## 2021-04-12 PROCEDURE — 87086 URINE CULTURE/COLONY COUNT: CPT

## 2021-04-14 LAB
BACTERIA UR CULT: NORMAL
SIGNIFICANT IND 70042: NORMAL
SITE SITE: NORMAL
SOURCE SOURCE: NORMAL

## 2021-07-26 ENCOUNTER — OFFICE VISIT (OUTPATIENT)
Dept: INTERNAL MEDICINE | Facility: IMAGING CENTER | Age: 41
End: 2021-07-26
Payer: COMMERCIAL

## 2021-07-26 VITALS
WEIGHT: 196.43 LBS | SYSTOLIC BLOOD PRESSURE: 122 MMHG | HEART RATE: 79 BPM | RESPIRATION RATE: 17 BRPM | DIASTOLIC BLOOD PRESSURE: 68 MMHG | BODY MASS INDEX: 29.09 KG/M2 | OXYGEN SATURATION: 98 % | TEMPERATURE: 98.5 F | HEIGHT: 69 IN

## 2021-07-26 DIAGNOSIS — R05.9 COUGH: ICD-10-CM

## 2021-07-26 DIAGNOSIS — F32.1 CURRENT MODERATE EPISODE OF MAJOR DEPRESSIVE DISORDER WITHOUT PRIOR EPISODE (HCC): ICD-10-CM

## 2021-07-26 DIAGNOSIS — R09.82 POST-NASAL DRIP: ICD-10-CM

## 2021-07-26 PROBLEM — N39.3 FEMALE STRESS INCONTINENCE: Status: ACTIVE | Noted: 2021-04-06

## 2021-07-26 PROCEDURE — 99214 OFFICE O/P EST MOD 30 MIN: CPT | Performed by: FAMILY MEDICINE

## 2021-07-26 RX ORDER — BUPROPION HYDROCHLORIDE 150 MG/1
150 TABLET ORAL EVERY MORNING
Qty: 30 TABLET | Refills: 3 | Status: SHIPPED | OUTPATIENT
Start: 2021-07-26 | End: 2021-11-22

## 2021-07-26 RX ORDER — OXYBUTYNIN CHLORIDE 5 MG/1
TABLET ORAL
Status: ON HOLD | COMMUNITY
End: 2022-01-28

## 2021-07-26 RX ORDER — MONTELUKAST SODIUM 10 MG/1
10 TABLET ORAL DAILY
Qty: 30 TABLET | Refills: 3 | Status: SHIPPED | OUTPATIENT
Start: 2021-07-26 | End: 2021-11-22

## 2021-07-26 RX ORDER — AZELASTINE 1 MG/ML
1 SPRAY, METERED NASAL 2 TIMES DAILY
Qty: 30 ML | Refills: 3 | Status: ON HOLD | OUTPATIENT
Start: 2021-07-26 | End: 2022-01-28

## 2021-07-26 ASSESSMENT — PATIENT HEALTH QUESTIONNAIRE - PHQ9
3. TROUBLE FALLING OR STAYING ASLEEP OR SLEEPING TOO MUCH: NOT AT ALL
SUM OF ALL RESPONSES TO PHQ QUESTIONS 1-9: 2
1. LITTLE INTEREST OR PLEASURE IN DOING THINGS: SEVERAL DAYS
2. FEELING DOWN, DEPRESSED, IRRITABLE, OR HOPELESS: SEVERAL DAYS
9. THOUGHTS THAT YOU WOULD BE BETTER OFF DEAD, OR OF HURTING YOURSELF: NOT AT ALL
5. POOR APPETITE OR OVEREATING: NOT AT ALL
6. FEELING BAD ABOUT YOURSELF - OR THAT YOU ARE A FAILURE OR HAVE LET YOURSELF OR YOUR FAMILY DOWN: NOT AL ALL
8. MOVING OR SPEAKING SO SLOWLY THAT OTHER PEOPLE COULD HAVE NOTICED. OR THE OPPOSITE, BEING SO FIGETY OR RESTLESS THAT YOU HAVE BEEN MOVING AROUND A LOT MORE THAN USUAL: NOT AT ALL
7. TROUBLE CONCENTRATING ON THINGS, SUCH AS READING THE NEWSPAPER OR WATCHING TELEVISION: NOT AT ALL
4. FEELING TIRED OR HAVING LITTLE ENERGY: NOT AT ALL
SUM OF ALL RESPONSES TO PHQ9 QUESTIONS 1 AND 2: 2

## 2021-07-26 ASSESSMENT — FIBROSIS 4 INDEX: FIB4 SCORE: 0.76

## 2021-07-26 NOTE — PROGRESS NOTES
Chief Complaint   Patient presents with   • Sore Throat     Post nasal drip since May only at night left side predominately happens exactly at 5pm and her gland will get swollen. Dry nagging cough only at night that makes it difficult to sleep. Has tried allegra and zyrtec during the day. Has also tried nyquil, benadryl, mucinex, and nyquil.       HPI:  Patient is a 40 y.o. female established patient who presents today for evaluation of new post nasal drip symptoms that started at end of May 2021. She reports symptoms as starting every afternoon with post nasal drip, left sided lymph node irritation under left jaw, and subsequent dry cough without associated acute sinus infection symptoms. She has tried elevating head of bed, Zyrtec, Allegra, Nyquil, Mucinex, Benadryl, and OTC cough medication PRN without symptom resolution. During the time frame, she has traveled in different climates and places throughout the country, and symptoms have remained unchanged. She has history of chronic depression and is currently taking Zoloft 50 mg daily. Unfortunately, she is experiencing significant sexual side effects (unable to climax) and is interested in switching medications. She does feel much better with daily antidepressant use and would like to trial Wellbutrin use. She denies safety concerns and does not report any other symptoms at this time. She had a Mirena IUD placed 6/8/2021 by Dr. Zaman to assist with significant menstrual bleeding but is still experiencing ongoing spotting. Due to Dr. Zaman's MCC, she has an establishing appointment in near future with Dr. Sherwood for ongoing GYN management. She is due for annual fasting labs in August and is otherwise stable at this time.     Patient Active Problem List    Diagnosis Date Noted   • Female stress incontinence 04/06/2021   • Obesity (BMI 30-39.9) 09/03/2020   • Major depressive disorder 05/27/2020   • Primary insomnia 08/19/2019   • Cervical high risk human  "papillomavirus (HPV) DNA test positive 07/15/2019   • Vitamin D deficiency 07/12/2019   • Mixed dyslipidemia 08/22/2018   • TRACEY (generalized anxiety disorder) 11/10/2017   • History of recurrent UTI (urinary tract infection) 09/20/2017   • Acne vulgaris 09/20/2017   • Upper airway resistance syndrome 05/26/2017   • AMELIA (obstructive sleep apnea) 04/03/2017       Past medical, surgical, family, and social history was reviewed and updated in Epic chart by me today.     Medications and allergies reviewed and updated in Epic chart by me today.     ROS:  Pertinent positives listed above in HPI. All other systems have been reviewed and are negative.    PE:   /68 (BP Location: Left arm, Patient Position: Sitting, BP Cuff Size: Adult)   Pulse 79   Temp 36.9 °C (98.5 °F) (Temporal)   Resp 17   Ht 1.753 m (5' 9\")   Wt 89.1 kg (196 lb 6.9 oz)   SpO2 98%   BMI 29.01 kg/m²   Vital signs reviewed with patient.     Gen: Well developed; well nourished; no acute distress; age appropriate appearance   HEENT: Normocephalic; atraumatic; PEERLA b/l; sclera clear b/l; b/l external auditory canals WNL; b/l TM WNL; nares patent but inflammed; oropharynx clear but tonsils enlarged; oral mucosa moist; tongue midline; dentition adequate   Neck: No adenopathy; no thyromegaly  CV: Regular rate and rhythm; S1/ S2 present; no murmur, gallop or rub noted  Pulm: No respiratory distress; clear to ascultation b/l; no wheezing or stridor noted b/l  Extremities: No peripheral edema b/l LE extremities/ no clubbing nor cyanosis noted  Skin: Warm and dry; no rashes noted   Neuro: No focal deficits noted   Psych: AAOx4; mood and affect are appropriate    A/P:  1. Post-nasal drip  New condition that started in May without associated acute illness. Recommend patient discontinue current Zyrtec/Allegra use and start daily Xyzal and Astelin use - will follow clinical response. New RX sent to pharmacy.   - azelastine (ASTELIN) 137 MCG/SPRAY nasal " spray; Administer 1 Spray into affected nostril(S) 2 times a day.  Dispense: 30 mL; Refill: 3    2. Cough  New condition secondary to post nasal drip. Cough is keeping her awake at night and non responsive to Mucinex and OTC cough syrup use. Recommend patient start daily Singulair use and follow clinical response. New RX sent to pharmacy.   - montelukast (SINGULAIR) 10 MG Tab; Take 1 tablet by mouth every day.  Dispense: 30 tablet; Refill: 3    3. Current moderate episode of major depressive disorder without prior episode (HCC)  Stable from a mental health perspective with daily Zoloft 50 mg use. Unfortunately, she is experiencing significant sexual side effects (unable to climax) and would like to try Wellbutrin. Recommend patient discontinue current Zoloft use, start Wellbutrin use, and follow clinical response. New RX sent to pharmacy and patient will update me accordingly. No overt safety concerns present.   - buPROPion (WELLBUTRIN XL) 150 MG XL tablet; Take 1 tablet by mouth every morning.  Dispense: 30 tablet; Refill: 3     Patient will return for fasting lab draw in August with Vangie GONZALEZ, and I would like to see her for an office visit in 3-4 weeks for ongoing medication management. Vangie GONZALEZ will request GYN reports to update our records.

## 2021-09-07 DIAGNOSIS — Z00.00 HEALTH CARE MAINTENANCE: ICD-10-CM

## 2021-09-07 DIAGNOSIS — E55.9 VITAMIN D DEFICIENCY: ICD-10-CM

## 2021-09-07 DIAGNOSIS — E78.2 MIXED DYSLIPIDEMIA: Chronic | ICD-10-CM

## 2021-09-08 ENCOUNTER — NON-PROVIDER VISIT (OUTPATIENT)
Dept: INTERNAL MEDICINE | Facility: IMAGING CENTER | Age: 41
End: 2021-09-08
Payer: COMMERCIAL

## 2021-09-08 ENCOUNTER — HOSPITAL ENCOUNTER (OUTPATIENT)
Facility: MEDICAL CENTER | Age: 41
End: 2021-09-08
Attending: FAMILY MEDICINE
Payer: COMMERCIAL

## 2021-09-08 DIAGNOSIS — Z00.00 HEALTH CARE MAINTENANCE: ICD-10-CM

## 2021-09-08 DIAGNOSIS — E78.2 MIXED DYSLIPIDEMIA: Chronic | ICD-10-CM

## 2021-09-08 DIAGNOSIS — E55.9 VITAMIN D DEFICIENCY: ICD-10-CM

## 2021-09-08 LAB
25(OH)D3 SERPL-MCNC: 52 NG/ML (ref 30–100)
ALBUMIN SERPL BCP-MCNC: 4.5 G/DL (ref 3.2–4.9)
ALBUMIN/GLOB SERPL: 1.5 G/DL
ALP SERPL-CCNC: 68 U/L (ref 30–99)
ALT SERPL-CCNC: 32 U/L (ref 2–50)
ANION GAP SERPL CALC-SCNC: 13 MMOL/L (ref 7–16)
AST SERPL-CCNC: 39 U/L (ref 12–45)
BASOPHILS # BLD AUTO: 0.4 % (ref 0–1.8)
BASOPHILS # BLD: 0.03 K/UL (ref 0–0.12)
BILIRUB SERPL-MCNC: 0.9 MG/DL (ref 0.1–1.5)
BUN SERPL-MCNC: 11 MG/DL (ref 8–22)
CALCIUM SERPL-MCNC: 9.8 MG/DL (ref 8.5–10.5)
CHLORIDE SERPL-SCNC: 100 MMOL/L (ref 96–112)
CHOLEST SERPL-MCNC: 162 MG/DL (ref 100–199)
CO2 SERPL-SCNC: 25 MMOL/L (ref 20–33)
CREAT SERPL-MCNC: 0.71 MG/DL (ref 0.5–1.4)
EOSINOPHIL # BLD AUTO: 0.04 K/UL (ref 0–0.51)
EOSINOPHIL NFR BLD: 0.6 % (ref 0–6.9)
ERYTHROCYTE [DISTWIDTH] IN BLOOD BY AUTOMATED COUNT: 44.1 FL (ref 35.9–50)
EST. AVERAGE GLUCOSE BLD GHB EST-MCNC: 100 MG/DL
GLOBULIN SER CALC-MCNC: 3 G/DL (ref 1.9–3.5)
GLUCOSE SERPL-MCNC: 75 MG/DL (ref 65–99)
HBA1C MFR BLD: 5.1 % (ref 4–5.6)
HCT VFR BLD AUTO: 45.1 % (ref 37–47)
HDLC SERPL-MCNC: 65 MG/DL
HGB BLD-MCNC: 15.2 G/DL (ref 12–16)
IMM GRANULOCYTES # BLD AUTO: 0.02 K/UL (ref 0–0.11)
IMM GRANULOCYTES NFR BLD AUTO: 0.3 % (ref 0–0.9)
LDLC SERPL CALC-MCNC: 76 MG/DL
LYMPHOCYTES # BLD AUTO: 1.95 K/UL (ref 1–4.8)
LYMPHOCYTES NFR BLD: 27 % (ref 22–41)
MCH RBC QN AUTO: 33 PG (ref 27–33)
MCHC RBC AUTO-ENTMCNC: 33.7 G/DL (ref 33.6–35)
MCV RBC AUTO: 98 FL (ref 81.4–97.8)
MONOCYTES # BLD AUTO: 0.44 K/UL (ref 0–0.85)
MONOCYTES NFR BLD AUTO: 6.1 % (ref 0–13.4)
NEUTROPHILS # BLD AUTO: 4.74 K/UL (ref 2–7.15)
NEUTROPHILS NFR BLD: 65.6 % (ref 44–72)
NRBC # BLD AUTO: 0 K/UL
NRBC BLD-RTO: 0 /100 WBC
PLATELET # BLD AUTO: 278 K/UL (ref 164–446)
PMV BLD AUTO: 10.9 FL (ref 9–12.9)
POTASSIUM SERPL-SCNC: 4.2 MMOL/L (ref 3.6–5.5)
PROT SERPL-MCNC: 7.5 G/DL (ref 6–8.2)
RBC # BLD AUTO: 4.6 M/UL (ref 4.2–5.4)
SODIUM SERPL-SCNC: 138 MMOL/L (ref 135–145)
TRIGL SERPL-MCNC: 107 MG/DL (ref 0–149)
TSH SERPL DL<=0.005 MIU/L-ACNC: 2.4 UIU/ML (ref 0.38–5.33)
WBC # BLD AUTO: 7.2 K/UL (ref 4.8–10.8)

## 2021-09-08 PROCEDURE — 80053 COMPREHEN METABOLIC PANEL: CPT

## 2021-09-08 PROCEDURE — 82306 VITAMIN D 25 HYDROXY: CPT

## 2021-09-08 PROCEDURE — 85025 COMPLETE CBC W/AUTO DIFF WBC: CPT

## 2021-09-08 PROCEDURE — 80061 LIPID PANEL: CPT

## 2021-09-08 PROCEDURE — 83036 HEMOGLOBIN GLYCOSYLATED A1C: CPT

## 2021-09-08 PROCEDURE — 84443 ASSAY THYROID STIM HORMONE: CPT

## 2021-09-15 ENCOUNTER — OFFICE VISIT (OUTPATIENT)
Dept: INTERNAL MEDICINE | Facility: IMAGING CENTER | Age: 41
End: 2021-09-15
Payer: COMMERCIAL

## 2021-09-15 VITALS
RESPIRATION RATE: 17 BRPM | BODY MASS INDEX: 28.58 KG/M2 | WEIGHT: 193 LBS | TEMPERATURE: 97.6 F | HEART RATE: 90 BPM | HEIGHT: 69 IN | DIASTOLIC BLOOD PRESSURE: 62 MMHG | SYSTOLIC BLOOD PRESSURE: 114 MMHG | OXYGEN SATURATION: 98 %

## 2021-09-15 DIAGNOSIS — F32.1 CURRENT MODERATE EPISODE OF MAJOR DEPRESSIVE DISORDER WITHOUT PRIOR EPISODE (HCC): ICD-10-CM

## 2021-09-15 DIAGNOSIS — R87.810 CERVICAL HIGH RISK HUMAN PAPILLOMAVIRUS (HPV) DNA TEST POSITIVE: ICD-10-CM

## 2021-09-15 DIAGNOSIS — N39.3 FEMALE STRESS INCONTINENCE: ICD-10-CM

## 2021-09-15 DIAGNOSIS — Z71.85 VACCINE COUNSELING: ICD-10-CM

## 2021-09-15 DIAGNOSIS — G47.8 UPPER AIRWAY RESISTANCE SYNDROME: Chronic | ICD-10-CM

## 2021-09-15 DIAGNOSIS — Z12.31 ENCOUNTER FOR SCREENING MAMMOGRAM FOR MALIGNANT NEOPLASM OF BREAST: ICD-10-CM

## 2021-09-15 DIAGNOSIS — E78.2 MIXED DYSLIPIDEMIA: Chronic | ICD-10-CM

## 2021-09-15 DIAGNOSIS — G47.33 OSA (OBSTRUCTIVE SLEEP APNEA): ICD-10-CM

## 2021-09-15 DIAGNOSIS — F41.1 GENERALIZED ANXIETY DISORDER: Chronic | ICD-10-CM

## 2021-09-15 DIAGNOSIS — Z00.00 WELLNESS EXAMINATION: ICD-10-CM

## 2021-09-15 DIAGNOSIS — E55.9 VITAMIN D DEFICIENCY: ICD-10-CM

## 2021-09-15 PROBLEM — E66.9 OBESITY (BMI 30-39.9): Status: RESOLVED | Noted: 2020-09-03 | Resolved: 2021-09-15

## 2021-09-15 PROCEDURE — 99396 PREV VISIT EST AGE 40-64: CPT | Performed by: FAMILY MEDICINE

## 2021-09-15 RX ORDER — ROSUVASTATIN CALCIUM 10 MG/1
10 TABLET, COATED ORAL
Qty: 90 TABLET | Refills: 3 | Status: SHIPPED | OUTPATIENT
Start: 2021-09-15 | End: 2022-10-15

## 2021-09-15 ASSESSMENT — FIBROSIS 4 INDEX: FIB4 SCORE: 1.02

## 2021-09-15 ASSESSMENT — PATIENT HEALTH QUESTIONNAIRE - PHQ9: CLINICAL INTERPRETATION OF PHQ2 SCORE: 0

## 2021-09-15 NOTE — PROGRESS NOTES
Chief Complaint   Patient presents with   • Annual Exam     Review results       HPI:  Patient is a 41 y.o. female established patient who presents today for her annual wellness visit and to review labs done 9/8/2021. She had her IUD removed last week by Dr. Sherwood and continues to experience significant menstrual bleeding (IUD removed due to spotting). She will follow up with Dr. Sherwood within the next few weeks and possible ablation evaluation as indicated. GYN exam UTD with Dr. Zaman prior to his long term (Vangie GONZALEZ has requested records). She reports that chronic TRACEY and major depressive disorder is stable currently with ongoing Wellbutrin XL use.  She has chronic mixed dyslipidemia with nightly Crestor use, and chronic AMELIA/ upper airway resistance syndrome with ongoing autoCPAP use. She will be due for annual screening mammogram in October 2021 and is aware of flu vaccine eligibility. She continues to exercise regularly, remains busy with her family, and is in good spirits today overall.     Patient Active Problem List    Diagnosis Date Noted   • Female stress incontinence 04/06/2021   • Major depressive disorder 05/27/2020   • Primary insomnia 08/19/2019   • Cervical high risk human papillomavirus (HPV) DNA test positive 07/15/2019   • Vitamin D deficiency 07/12/2019   • Mixed dyslipidemia 08/22/2018   • TRACEY (generalized anxiety disorder) 11/10/2017   • History of recurrent UTI (urinary tract infection) 09/20/2017   • Acne vulgaris 09/20/2017   • Upper airway resistance syndrome 05/26/2017   • AMELIA (obstructive sleep apnea) 04/03/2017       Past medical, surgical, family, and social history was reviewed and updated in Epic chart by me today.     Medications and allergies reviewed and updated in Epic chart by me today.     Lab results 9/8/2021 reviewed with patient at visit today.     ROS:  Pertinent positives listed above in HPI. All other systems have been reviewed and are negative.    PE:   /62 (BP  "Location: Left arm, Patient Position: Sitting, BP Cuff Size: Adult)   Pulse 90   Temp 36.4 °C (97.6 °F) (Temporal)   Resp 17   Ht 1.753 m (5' 9\")   Wt 87.5 kg (193 lb)   SpO2 98%   BMI 28.50 kg/m²   Vital signs reviewed with patient.     Gen: Well developed; well nourished; no acute distress; age appropriate appearance   HEENT: Normocephalic; atraumatic; PEERLA b/l; sclera clear b/l; b/l external auditory canals WNL; b/l TM WNL; nares patent; oropharynx clear; mask in place  Neck: No adenopathy; no thyromegaly  CV: Regular rate and rhythm; S1/ S2 present; no murmur, gallop or rub noted  Pulm: No respiratory distress; clear to ascultation b/l; no wheezing or stridor noted b/l  Abd: Adequate bowel sounds noted; soft and nontender; no rebound, rigidity, nor distention  Extremities: No peripheral edema b/l LE extremities/ no clubbing nor cyanosis noted  Skin: Warm and dry; no rashes noted   Neuro: No focal deficits noted   Psych: AAOx4; mood and affect are appropriate    A/P:  1. Upper airway resistance syndrome  Stable    2. TRACEY (generalized anxiety disorder)  Stable at this time.     3. Current moderate episode of major depressive disorder without prior episode (HCC)  Stable since switching from Zoloft to Wellbutrin XL use, and no overt safety concerns present.     4. Mixed dyslipidemia  Stable/ recommend patient continue current Crestor use and continue to exercise regularly, make healthy diet choices, and continue weight management. New RX sent to pharmacy.   - rosuvastatin (CRESTOR) 10 MG Tab; Take 1 Tablet by mouth at bedtime.  Dispense: 90 Tablet; Refill: 3    5. AMELIA (obstructive sleep apnea)  Stable with ongoing CPAP compliance.     6. Vitamin D deficiency  Stable/ patient can continue current Vitamin D supplementation for maintenance.     7. Cervical high risk human papillomavirus (HPV) DNA test positive  Managed by her GYN team. She had her IUD removed by Dr. Sherwood last week (due to spotting)    8. Female " stress incontinence  Stable with ongoing Ditropan use.     9. Vaccine counseling  Patient is aware of influenza vaccine eligibility.     10. Encounter for screening mammogram for malignant neoplasm of breast  Annual screening mammogram will be due in October, and she will call to schedule imaging exam accordingly.   - MA-SCREENING MAMMO BILAT W/TOMOSYNTHESIS W/CAD; Future    11. Wellness examination  Patient is stable at this time and will update me in the next few weeks about the progression of her chronic TRACEY and depression status.

## 2021-10-08 ENCOUNTER — NON-PROVIDER VISIT (OUTPATIENT)
Dept: INTERNAL MEDICINE | Facility: IMAGING CENTER | Age: 41
End: 2021-10-08
Payer: COMMERCIAL

## 2021-10-08 DIAGNOSIS — Z23 NEED FOR VACCINATION: ICD-10-CM

## 2021-10-08 PROCEDURE — 90686 IIV4 VACC NO PRSV 0.5 ML IM: CPT | Performed by: FAMILY MEDICINE

## 2021-10-08 PROCEDURE — 90471 IMMUNIZATION ADMIN: CPT | Performed by: FAMILY MEDICINE

## 2021-10-12 NOTE — TELEPHONE ENCOUNTER
Called patient, LEFT MSG, requested a call back and suggested utilizing [employer] if that's easier.  Her pressures are set appropriately @ 5-10cm (I reviewed them remotely on [employer]).  However, I'd be happy to answer any questions she may have regarding her machine to the best of my ability.   [Initial Evaluation] : an initial evaluation for [FreeTextEntry2] : ear

## 2021-10-27 ENCOUNTER — HOSPITAL ENCOUNTER (OUTPATIENT)
Dept: RADIOLOGY | Facility: MEDICAL CENTER | Age: 41
End: 2021-10-27
Attending: FAMILY MEDICINE
Payer: COMMERCIAL

## 2021-10-27 DIAGNOSIS — Z12.31 ENCOUNTER FOR SCREENING MAMMOGRAM FOR MALIGNANT NEOPLASM OF BREAST: ICD-10-CM

## 2021-10-27 PROCEDURE — 77063 BREAST TOMOSYNTHESIS BI: CPT

## 2021-11-22 DIAGNOSIS — F32.1 CURRENT MODERATE EPISODE OF MAJOR DEPRESSIVE DISORDER WITHOUT PRIOR EPISODE (HCC): ICD-10-CM

## 2021-11-22 DIAGNOSIS — R05.9 COUGH: ICD-10-CM

## 2021-11-22 RX ORDER — BUPROPION HYDROCHLORIDE 150 MG/1
TABLET ORAL
Qty: 90 TABLET | Refills: 2 | Status: SHIPPED | OUTPATIENT
Start: 2021-11-22 | End: 2022-08-19

## 2021-11-22 RX ORDER — MONTELUKAST SODIUM 10 MG/1
10 TABLET ORAL DAILY
Qty: 90 TABLET | Refills: 2 | Status: ON HOLD | OUTPATIENT
Start: 2021-11-22 | End: 2022-01-28

## 2022-01-27 ENCOUNTER — ANESTHESIA EVENT (OUTPATIENT)
Dept: SURGERY | Facility: MEDICAL CENTER | Age: 42
End: 2022-01-27
Payer: COMMERCIAL

## 2022-01-27 ENCOUNTER — PRE-ADMISSION TESTING (OUTPATIENT)
Dept: ADMISSIONS | Facility: MEDICAL CENTER | Age: 42
End: 2022-01-27
Attending: OBSTETRICS & GYNECOLOGY
Payer: COMMERCIAL

## 2022-01-27 DIAGNOSIS — Z01.812 PRE-OPERATIVE LABORATORY EXAMINATION: ICD-10-CM

## 2022-01-27 LAB
ANION GAP SERPL CALC-SCNC: 10 MMOL/L (ref 7–16)
BUN SERPL-MCNC: 12 MG/DL (ref 8–22)
CALCIUM SERPL-MCNC: 9.7 MG/DL (ref 8.5–10.5)
CHLORIDE SERPL-SCNC: 101 MMOL/L (ref 96–112)
CO2 SERPL-SCNC: 25 MMOL/L (ref 20–33)
CREAT SERPL-MCNC: 0.69 MG/DL (ref 0.5–1.4)
GLUCOSE SERPL-MCNC: 68 MG/DL (ref 65–99)
HCG SERPL QL: NEGATIVE
POTASSIUM SERPL-SCNC: 4.2 MMOL/L (ref 3.6–5.5)
SARS-COV+SARS-COV-2 AG RESP QL IA.RAPID: NOTDETECTED
SODIUM SERPL-SCNC: 136 MMOL/L (ref 135–145)
SPECIMEN SOURCE: NORMAL

## 2022-01-27 PROCEDURE — 87426 SARSCOV CORONAVIRUS AG IA: CPT

## 2022-01-27 PROCEDURE — 80048 BASIC METABOLIC PNL TOTAL CA: CPT

## 2022-01-27 PROCEDURE — 36415 COLL VENOUS BLD VENIPUNCTURE: CPT

## 2022-01-27 PROCEDURE — 84703 CHORIONIC GONADOTROPIN ASSAY: CPT

## 2022-01-27 RX ORDER — IBUPROFEN 400 MG/1
400 TABLET ORAL EVERY 6 HOURS PRN
Status: ON HOLD | COMMUNITY
End: 2022-01-28

## 2022-01-27 RX ORDER — ALPRAZOLAM 0.25 MG/1
0.5 TABLET ORAL NIGHTLY PRN
COMMUNITY

## 2022-01-27 ASSESSMENT — FIBROSIS 4 INDEX: FIB4 SCORE: 1.02

## 2022-01-28 ENCOUNTER — HOSPITAL ENCOUNTER (OUTPATIENT)
Facility: MEDICAL CENTER | Age: 42
End: 2022-01-28
Attending: OBSTETRICS & GYNECOLOGY | Admitting: OBSTETRICS & GYNECOLOGY
Payer: COMMERCIAL

## 2022-01-28 ENCOUNTER — ANESTHESIA (OUTPATIENT)
Dept: SURGERY | Facility: MEDICAL CENTER | Age: 42
End: 2022-01-28
Payer: COMMERCIAL

## 2022-01-28 VITALS
WEIGHT: 197.75 LBS | BODY MASS INDEX: 29.29 KG/M2 | DIASTOLIC BLOOD PRESSURE: 57 MMHG | RESPIRATION RATE: 14 BRPM | OXYGEN SATURATION: 99 % | SYSTOLIC BLOOD PRESSURE: 101 MMHG | HEART RATE: 70 BPM | HEIGHT: 69 IN | TEMPERATURE: 97 F

## 2022-01-28 DIAGNOSIS — G89.18 POSTOPERATIVE PAIN: ICD-10-CM

## 2022-01-28 LAB — PATHOLOGY CONSULT NOTE: NORMAL

## 2022-01-28 PROCEDURE — 502587 HCHG PACK, D&C: Performed by: OBSTETRICS & GYNECOLOGY

## 2022-01-28 PROCEDURE — 700105 HCHG RX REV CODE 258: Performed by: OBSTETRICS & GYNECOLOGY

## 2022-01-28 PROCEDURE — 160046 HCHG PACU - 1ST 60 MINS PHASE II: Performed by: OBSTETRICS & GYNECOLOGY

## 2022-01-28 PROCEDURE — 160025 RECOVERY II MINUTES (STATS): Performed by: OBSTETRICS & GYNECOLOGY

## 2022-01-28 PROCEDURE — 160009 HCHG ANES TIME/MIN: Performed by: OBSTETRICS & GYNECOLOGY

## 2022-01-28 PROCEDURE — A9270 NON-COVERED ITEM OR SERVICE: HCPCS | Performed by: ANESTHESIOLOGY

## 2022-01-28 PROCEDURE — 700102 HCHG RX REV CODE 250 W/ 637 OVERRIDE(OP): Performed by: ANESTHESIOLOGY

## 2022-01-28 PROCEDURE — 88305 TISSUE EXAM BY PATHOLOGIST: CPT

## 2022-01-28 PROCEDURE — 160002 HCHG RECOVERY MINUTES (STAT): Performed by: OBSTETRICS & GYNECOLOGY

## 2022-01-28 PROCEDURE — 700111 HCHG RX REV CODE 636 W/ 250 OVERRIDE (IP): Performed by: ANESTHESIOLOGY

## 2022-01-28 PROCEDURE — 160048 HCHG OR STATISTICAL LEVEL 1-5: Performed by: OBSTETRICS & GYNECOLOGY

## 2022-01-28 PROCEDURE — 160041 HCHG SURGERY MINUTES - EA ADDL 1 MIN LEVEL 4: Performed by: OBSTETRICS & GYNECOLOGY

## 2022-01-28 PROCEDURE — 160029 HCHG SURGERY MINUTES - 1ST 30 MINS LEVEL 4: Performed by: OBSTETRICS & GYNECOLOGY

## 2022-01-28 PROCEDURE — 700101 HCHG RX REV CODE 250: Performed by: ANESTHESIOLOGY

## 2022-01-28 PROCEDURE — 160035 HCHG PACU - 1ST 60 MINS PHASE I: Performed by: OBSTETRICS & GYNECOLOGY

## 2022-01-28 PROCEDURE — 160036 HCHG PACU - EA ADDL 30 MINS PHASE I: Performed by: OBSTETRICS & GYNECOLOGY

## 2022-01-28 PROCEDURE — 160047 HCHG PACU  - EA ADDL 30 MINS PHASE II: Performed by: OBSTETRICS & GYNECOLOGY

## 2022-01-28 PROCEDURE — 700101 HCHG RX REV CODE 250: Performed by: OBSTETRICS & GYNECOLOGY

## 2022-01-28 RX ORDER — DIPHENHYDRAMINE HYDROCHLORIDE 50 MG/ML
12.5 INJECTION INTRAMUSCULAR; INTRAVENOUS
Status: DISCONTINUED | OUTPATIENT
Start: 2022-01-28 | End: 2022-01-28 | Stop reason: HOSPADM

## 2022-01-28 RX ORDER — MEPERIDINE HYDROCHLORIDE 25 MG/ML
12.5 INJECTION INTRAMUSCULAR; INTRAVENOUS; SUBCUTANEOUS
Status: DISCONTINUED | OUTPATIENT
Start: 2022-01-28 | End: 2022-01-28 | Stop reason: HOSPADM

## 2022-01-28 RX ORDER — HYDROMORPHONE HYDROCHLORIDE 1 MG/ML
0.4 INJECTION, SOLUTION INTRAMUSCULAR; INTRAVENOUS; SUBCUTANEOUS
Status: DISCONTINUED | OUTPATIENT
Start: 2022-01-28 | End: 2022-01-28 | Stop reason: HOSPADM

## 2022-01-28 RX ORDER — OXYCODONE HCL 5 MG/5 ML
5 SOLUTION, ORAL ORAL
Status: COMPLETED | OUTPATIENT
Start: 2022-01-28 | End: 2022-01-28

## 2022-01-28 RX ORDER — DEXAMETHASONE SODIUM PHOSPHATE 4 MG/ML
INJECTION, SOLUTION INTRA-ARTICULAR; INTRALESIONAL; INTRAMUSCULAR; INTRAVENOUS; SOFT TISSUE PRN
Status: DISCONTINUED | OUTPATIENT
Start: 2022-01-28 | End: 2022-01-28 | Stop reason: SURG

## 2022-01-28 RX ORDER — HYDROMORPHONE HYDROCHLORIDE 1 MG/ML
0.1 INJECTION, SOLUTION INTRAMUSCULAR; INTRAVENOUS; SUBCUTANEOUS
Status: DISCONTINUED | OUTPATIENT
Start: 2022-01-28 | End: 2022-01-28 | Stop reason: HOSPADM

## 2022-01-28 RX ORDER — CELECOXIB 200 MG/1
200 CAPSULE ORAL ONCE
Status: COMPLETED | OUTPATIENT
Start: 2022-01-28 | End: 2022-01-28

## 2022-01-28 RX ORDER — PROMETHAZINE HYDROCHLORIDE 25 MG/1
25 SUPPOSITORY RECTAL EVERY 4 HOURS PRN
Status: DISCONTINUED | OUTPATIENT
Start: 2022-01-28 | End: 2022-01-28 | Stop reason: HOSPADM

## 2022-01-28 RX ORDER — HYDROMORPHONE HYDROCHLORIDE 1 MG/ML
0.2 INJECTION, SOLUTION INTRAMUSCULAR; INTRAVENOUS; SUBCUTANEOUS
Status: DISCONTINUED | OUTPATIENT
Start: 2022-01-28 | End: 2022-01-28 | Stop reason: HOSPADM

## 2022-01-28 RX ORDER — OXYCODONE HYDROCHLORIDE AND ACETAMINOPHEN 5; 325 MG/1; MG/1
1 TABLET ORAL EVERY 6 HOURS PRN
Qty: 15 TABLET | Refills: 0 | Status: SHIPPED | OUTPATIENT
Start: 2022-01-28 | End: 2022-02-02

## 2022-01-28 RX ORDER — ONDANSETRON 2 MG/ML
INJECTION INTRAMUSCULAR; INTRAVENOUS PRN
Status: DISCONTINUED | OUTPATIENT
Start: 2022-01-28 | End: 2022-01-28 | Stop reason: SURG

## 2022-01-28 RX ORDER — MONTELUKAST SODIUM 10 MG/1
10 TABLET ORAL EVERY EVENING
COMMUNITY
End: 2022-03-24 | Stop reason: SDUPTHER

## 2022-01-28 RX ORDER — ACETAMINOPHEN 500 MG
1000 TABLET ORAL ONCE
Status: COMPLETED | OUTPATIENT
Start: 2022-01-28 | End: 2022-01-28

## 2022-01-28 RX ORDER — SODIUM CHLORIDE, SODIUM LACTATE, POTASSIUM CHLORIDE, CALCIUM CHLORIDE 600; 310; 30; 20 MG/100ML; MG/100ML; MG/100ML; MG/100ML
INJECTION, SOLUTION INTRAVENOUS CONTINUOUS
Status: DISCONTINUED | OUTPATIENT
Start: 2022-01-28 | End: 2022-01-28 | Stop reason: HOSPADM

## 2022-01-28 RX ORDER — HALOPERIDOL 5 MG/ML
1 INJECTION INTRAMUSCULAR
Status: DISCONTINUED | OUTPATIENT
Start: 2022-01-28 | End: 2022-01-28 | Stop reason: HOSPADM

## 2022-01-28 RX ORDER — OXYCODONE HCL 5 MG/5 ML
10 SOLUTION, ORAL ORAL
Status: COMPLETED | OUTPATIENT
Start: 2022-01-28 | End: 2022-01-28

## 2022-01-28 RX ORDER — SODIUM CHLORIDE, SODIUM LACTATE, POTASSIUM CHLORIDE, CALCIUM CHLORIDE 600; 310; 30; 20 MG/100ML; MG/100ML; MG/100ML; MG/100ML
INJECTION, SOLUTION INTRAVENOUS CONTINUOUS
Status: ACTIVE | OUTPATIENT
Start: 2022-01-28 | End: 2022-01-28

## 2022-01-28 RX ORDER — IBUPROFEN 800 MG/1
800 TABLET ORAL EVERY 8 HOURS PRN
Qty: 30 TABLET | Refills: 1 | Status: SHIPPED | OUTPATIENT
Start: 2022-01-28

## 2022-01-28 RX ORDER — SIMETHICONE 125 MG
125 TABLET,CHEWABLE ORAL 3 TIMES DAILY PRN
Status: DISCONTINUED | OUTPATIENT
Start: 2022-01-28 | End: 2022-01-28 | Stop reason: HOSPADM

## 2022-01-28 RX ORDER — ONDANSETRON 2 MG/ML
4 INJECTION INTRAMUSCULAR; INTRAVENOUS
Status: DISCONTINUED | OUTPATIENT
Start: 2022-01-28 | End: 2022-01-28 | Stop reason: HOSPADM

## 2022-01-28 RX ORDER — LIDOCAINE HYDROCHLORIDE 20 MG/ML
INJECTION, SOLUTION EPIDURAL; INFILTRATION; INTRACAUDAL; PERINEURAL PRN
Status: DISCONTINUED | OUTPATIENT
Start: 2022-01-28 | End: 2022-01-28 | Stop reason: SURG

## 2022-01-28 RX ORDER — CEFAZOLIN SODIUM 1 G/3ML
INJECTION, POWDER, FOR SOLUTION INTRAMUSCULAR; INTRAVENOUS PRN
Status: DISCONTINUED | OUTPATIENT
Start: 2022-01-28 | End: 2022-01-28 | Stop reason: SURG

## 2022-01-28 RX ORDER — MIDAZOLAM HYDROCHLORIDE 1 MG/ML
INJECTION INTRAMUSCULAR; INTRAVENOUS PRN
Status: DISCONTINUED | OUTPATIENT
Start: 2022-01-28 | End: 2022-01-28 | Stop reason: SURG

## 2022-01-28 RX ADMIN — PROPOFOL 30 MG: 10 INJECTION, EMULSION INTRAVENOUS at 10:56

## 2022-01-28 RX ADMIN — FENTANYL CITRATE 50 MCG: 50 INJECTION, SOLUTION INTRAMUSCULAR; INTRAVENOUS at 11:12

## 2022-01-28 RX ADMIN — FENTANYL CITRATE 50 MCG: 50 INJECTION, SOLUTION INTRAMUSCULAR; INTRAVENOUS at 10:44

## 2022-01-28 RX ADMIN — DEXAMETHASONE SODIUM PHOSPHATE 8 MG: 4 INJECTION, SOLUTION INTRA-ARTICULAR; INTRALESIONAL; INTRAMUSCULAR; INTRAVENOUS; SOFT TISSUE at 09:41

## 2022-01-28 RX ADMIN — ONDANSETRON 4 MG: 2 INJECTION INTRAMUSCULAR; INTRAVENOUS at 10:41

## 2022-01-28 RX ADMIN — PROPOFOL 50 MG: 10 INJECTION, EMULSION INTRAVENOUS at 09:38

## 2022-01-28 RX ADMIN — PROPOFOL 200 MG: 10 INJECTION, EMULSION INTRAVENOUS at 09:37

## 2022-01-28 RX ADMIN — SODIUM CHLORIDE, POTASSIUM CHLORIDE, SODIUM LACTATE AND CALCIUM CHLORIDE: 600; 310; 30; 20 INJECTION, SOLUTION INTRAVENOUS at 09:09

## 2022-01-28 RX ADMIN — ACETAMINOPHEN 1000 MG: 500 TABLET ORAL at 09:11

## 2022-01-28 RX ADMIN — LIDOCAINE HYDROCHLORIDE 100 MG: 20 INJECTION, SOLUTION EPIDURAL; INFILTRATION; INTRACAUDAL at 09:37

## 2022-01-28 RX ADMIN — FENTANYL CITRATE 50 MCG: 50 INJECTION, SOLUTION INTRAMUSCULAR; INTRAVENOUS at 10:34

## 2022-01-28 RX ADMIN — CELECOXIB 200 MG: 200 CAPSULE ORAL at 09:10

## 2022-01-28 RX ADMIN — MIDAZOLAM HYDROCHLORIDE 2 MG: 1 INJECTION, SOLUTION INTRAMUSCULAR; INTRAVENOUS at 09:34

## 2022-01-28 RX ADMIN — CEFAZOLIN 2 G: 330 INJECTION, POWDER, FOR SOLUTION INTRAMUSCULAR; INTRAVENOUS at 09:40

## 2022-01-28 RX ADMIN — HYDROMORPHONE HYDROCHLORIDE 0.2 MG: 1 INJECTION, SOLUTION INTRAMUSCULAR; INTRAVENOUS; SUBCUTANEOUS at 12:03

## 2022-01-28 RX ADMIN — FENTANYL CITRATE 50 MCG: 50 INJECTION, SOLUTION INTRAMUSCULAR; INTRAVENOUS at 09:34

## 2022-01-28 RX ADMIN — OXYCODONE HYDROCHLORIDE 10 MG: 5 SOLUTION ORAL at 11:12

## 2022-01-28 RX ADMIN — FENTANYL CITRATE 50 MCG: 50 INJECTION, SOLUTION INTRAMUSCULAR; INTRAVENOUS at 11:40

## 2022-01-28 ASSESSMENT — PAIN DESCRIPTION - PAIN TYPE: TYPE: SURGICAL PAIN

## 2022-01-28 ASSESSMENT — FIBROSIS 4 INDEX: FIB4 SCORE: 1.02

## 2022-01-28 ASSESSMENT — PAIN SCALES - GENERAL: PAIN_LEVEL: 0

## 2022-01-28 NOTE — ANESTHESIA TIME REPORT
Anesthesia Start and Stop Event Times     Date Time Event    1/28/2022 0902 Ready for Procedure     0933 Anesthesia Start     1101 Anesthesia Stop        Responsible Staff  01/28/22    Name Role Begin End    Leona Rojas M.D. Anesth 0933 1101        Preop Diagnosis (Free Text):  Pre-op Diagnosis     ABNORMAL UTERINE BLEEDING, PAIN OF UTERUS        Preop Diagnosis (Codes):    Premium Reason  Non-Premium    Comments:

## 2022-01-28 NOTE — OR NURSING
Pt arrived via gurney from OR w/ anesthesia and RN. OPA in place, jaw thrust provided. VSS. No blood on iveth-pad. Received report. Orders reviewed and initiated.

## 2022-01-28 NOTE — ANESTHESIA PREPROCEDURE EVALUATION
Case: [id number] Date/Time: 01/28/22 0915    Procedures:       DILATION AND CURETTAGE      ABLATION, ENDOMETRIUM, THERMAL, HYSTEROSCOPIC    Pre-op diagnosis: ABNORMAL UTERINE BLEEDING, PAIN OF UTERUS    Location: [facility] OR 15 / SURGERY [facility]    Surgeons: Misty Sherwood M.D.        42 yo F with upper airway resistance syndrome with occasional use of CPAP.  1st surgery.  Denies family history of problems with anesthesia.  No current CP/SOB/N/V symptoms.    NPO  Covid neg  Relevant Problems   ANESTHESIA   (positive) AMELIA (obstructive sleep apnea)      PULMONARY   (positive) History of recurrent UTI (urinary tract infection)      NEURO   (positive) History of recurrent UTI (urinary tract infection)      Other   (positive) Upper airway resistance syndrome       Physical Exam    Airway   Mallampati: II  TM distance: >3 FB  Neck ROM: full       Cardiovascular - normal exam  Rhythm: regular  Rate: normal  (-) murmur     Dental - normal exam           Pulmonary - normal exam  Breath sounds clear to auscultation     Abdominal    Neurological - normal exam                 Anesthesia Plan    ASA 2       Plan - general       Airway plan will be LMA          Induction: intravenous    Postoperative Plan: Postoperative administration of opioids is intended.    Pertinent diagnostic labs and testing reviewed    Informed Consent:    Anesthetic plan and risks discussed with patient and spouse.    Use of blood products discussed with: patient whom consented to blood products.

## 2022-01-28 NOTE — OR NURSING
1248 - pt to Stage II; up to chair and felt nauseated, weak and dizzy. Pt laid back in chair. BP 80s/50s. IV fluid restarted, wide open.    1255 -  at bedside. Pt resting in chair. Call light in reach.    1315 - BP improving, see flowsheets. Pt sleeping; O2 placed for comfort. No needs at this time. Call light in reach.    1410 - pt ambulated to bathroom. Voided without difficulty. States feeling better but very tired. Pt back to chair, resting at this time.  at bedside - discharge instructions reviewed with .    1445- pt verbalizes readiness to get dressed.  at bedside helping pt.    1500 - pt ready for discharge. Taken to car via wheelchair by CNA. No further needs.

## 2022-01-28 NOTE — OR NURSING
Pt recovered from phase I. Report called to phase II, Linda GONZALEZ. VSS. Pt drinking clear liquids and eating crackers. Tolerating well. Sonia-pad w/ scant drainage. Pt left via mahamed carroll/ RN. Stable condition. Notified Ranjeet .

## 2022-01-28 NOTE — DISCHARGE INSTRUCTIONS
ACTIVITY: Rest and take it easy for the first 24 hours.  A responsible adult is recommended to remain with you during that time.  It is normal to feel sleepy.  We encourage you to not do anything that requires balance, judgment or coordination.    MILD FLU-LIKE SYMPTOMS ARE NORMAL. YOU MAY EXPERIENCE GENERALIZED MUSCLE ACHES, THROAT IRRITATION, HEADACHE AND/OR SOME NAUSEA.    FOR 24 HOURS DO NOT:  Drive, operate machinery or run household appliances.  Drink beer or alcoholic beverages.   Make important decisions or sign legal documents.    SPECIAL INSTRUCTIONS:  Pelvic rest x 6 weeks   F/U in 2 weeks   Call office for any problems    Dilation and Curettage, Care After  These instructions give you information about caring for yourself after your procedure. Your doctor may also give you more specific instructions. Call your doctor if you have any problems or questions after your procedure.  Follow these instructions at home:  Activity  · Do not drive or use heavy machinery while taking prescription pain medicine.  · For 24 hours after your procedure, avoid driving.  · Take short walks often, followed by rest periods. Ask your doctor what activities are safe for you. After one or two days, you may be able to return to your normal activities.  · Do not lift anything that is heavier than 10 lb (4.5 kg) until your doctor approves.  · For at least 2 weeks, or as long as told by your doctor:  ? Do not douche.  ? Do not use tampons.  ? Do not have sex.  General instructions  · Take over-the-counter and prescription medicines only as told by your doctor. This is very important if you take blood thinning medicine.  · Do not take baths, swim, or use a hot tub until your doctor approves. Take showers instead of baths.  · Wear compression stockings as told by your doctor.  · It is up to you to get the results of your procedure. Ask your doctor when your results will be ready.  · Keep all follow-up visits as told by your doctor.  This is important.  Contact a doctor if:  · You have very bad cramps that get worse or do not get better with medicine.  · You have very bad pain in your belly (abdomen).  · You cannot drink fluids without throwing up (vomiting).  · You get pain in a different part of the area between your belly and thighs (pelvis).  · You have bad-smelling discharge from your vagina.  · You have a rash.  Get help right away if:  · You are bleeding a lot from your vagina. A lot of bleeding means soaking more than one sanitary pad in an hour, for 2 hours in a row.  · You have clumps of blood (blood clots) coming from your vagina.  · You have a fever or chills.  · Your belly feels very tender or hard.  · You have chest pain.  · You have trouble breathing.  · You cough up blood.  · You feel dizzy.  · You feel light-headed.  · You pass out (faint).  · You have pain in your neck or shoulder area.  Summary  · Take short walks often, followed by rest periods. Ask your doctor what activities are safe for you. After one or two days, you may be able to return to your normal activities.  · Do not lift anything that is heavier than 10 lb (4.5 kg) until your doctor approves.  · Do not take baths, swim, or use a hot tub until your doctor approves. Take showers instead of baths.  · Contact your doctor if you have any symptoms of infection, like bad-smelling discharge from your vagina.  This information is not intended to replace advice given to you by your health care provider. Make sure you discuss any questions you have with your health care provider.    DIET: To avoid nausea, slowly advance diet as tolerated, avoiding spicy or greasy foods for the first day.  Add more substantial food to your diet according to your physician's instructions.  Babies can be fed formula or breast milk as soon as they are hungry.  INCREASE FLUIDS AND FIBER TO AVOID CONSTIPATION.    SURGICAL DRESSING/BATHING: *may shower tomorrow*    FOLLOW-UP APPOINTMENT:  A  follow-up appointment should be arranged with your doctor in *2 weeks*; call to schedule.    You should CALL YOUR PHYSICIAN if you develop:  Fever greater than 101 degrees F.  Pain not relieved by medication, or persistent nausea or vomiting.  Excessive bleeding (blood soaking through dressing) or unexpected drainage from the wound.  Extreme redness or swelling around the incision site, drainage of pus or foul smelling drainage.  Inability to urinate or empty your bladder within 8 hours.  Problems with breathing or chest pain.    You should call 911 if you develop problems with breathing or chest pain.  If you are unable to contact your doctor or surgical center, you should go to the nearest emergency room or urgent care center.  Physician's telephone #: *Dr. Sherwood [phone]*    If any questions arise, call your doctor.  If your doctor is not available, please feel free to call the Surgical Center at [phone].     A registered nurse may call you a few days after your surgery to see how you are doing after your procedure.    MEDICATIONS: Resume taking daily medication.  Take prescribed pain medication with food.  If no medication is prescribed, you may take non-aspirin pain medication if needed.  PAIN MEDICATION CAN BE VERY CONSTIPATING.  Take a stool softener or laxative such as senokot, pericolace, or milk of magnesia if needed.    Prescription given for *Percocet*.  Last pain medication given at *11:12pm*.    If your physician has prescribed pain medication that includes Acetaminophen (Tylenol), do not take additional Acetaminophen (Tylenol) while taking the prescribed medication.    Depression / Suicide Risk    As you are discharged from this [facility] facility, it is important to learn how to keep safe from harming yourself.    Recognize the warning signs:  · Abrupt changes in personality, positive or negative- including increase in energy   · Giving away possessions  · Change in eating patterns-  significant weight changes-  positive or negative  · Change in sleeping patterns- unable to sleep or sleeping all the time   · Unwillingness or inability to communicate  · Depression  · Unusual sadness, discouragement and loneliness  · Talk of wanting to die  · Neglect of personal appearance   · Rebelliousness- reckless behavior  · Withdrawal from people/activities they love  · Confusion- inability to concentrate     If you or a loved one observes any of these behaviors or has concerns about self-harm, here's what you can do:  · Talk about it- your feelings and reasons for harming yourself  · Remove any means that you might use to hurt yourself (examples: pills, rope, extension cords, firearm)  · Get professional help from the community (Mental Health, Substance Abuse, psychological counseling)  · Do not be alone:Call your Safe Contact- someone whom you trust who will be there for you.  · Call your local CRISIS HOTLINE 070-2691 or 977-701-3940  · Call your local Children's Mobile Crisis Response Team [place] (268) 307-2494 or www."[employer]"  · Call the toll free National Suicide Prevention Hotlines   · National Suicide Prevention Lifeline 064-151-WGJW (8368)  · National Hope Line Network 800-SUICIDE (475-6674)

## 2022-01-28 NOTE — OR SURGEON
Immediate Post OP Note    PreOp Diagnosis:   1. AUB  2. Pelvic Pain    PostOp Diagnosis:   1. AUB  2. Pelvic Pain    Procedure(s):  DILATION AND CURETTAGE  ABLATION, ENDOMETRIUM, THERMAL, HYSTEROSCOPIC    Surgeon(s):  Misty Sherwood M.D.    Anesthesiologist/Type of Anesthesia:  Anesthesiologist: Leona Rojas M.D./[facility]    Surgical Staff:  Circulator: Eliana Schwartz  Scrub Person: Bhumi De Oliveira; Salena Hess    Specimens removed if any:  Endometrial Curettage     Estimated Blood Loss: 5 cc    Findings: enlarged boggy uterus, thickened endometrium. Uterus sounded to 10 cm.    Complications: None        1/28/2022 9:14 AM Misty Sherwood M.D.

## 2022-01-28 NOTE — ANESTHESIA POSTPROCEDURE EVALUATION
Patient: Desire Mancini    Procedure Summary     Date: 01/28/22 Room / Location: [place] [address] / SURGERY [facility]    Anesthesia Start:  Anesthesia Stop:     Procedures:       DILATION AND CURETTAGE      ABLATION, ENDOMETRIUM, THERMAL, HYSTEROSCOPIC Diagnosis: (ABNORMAL UTERINE BLEEDING, PAIN OF UTERUS)    Surgeons: Misty Sherwood M.D. Responsible Provider:     Anesthesia Type: general ASA Status: 2          Final Anesthesia Type: general  Last vitals  BP   Blood Pressure: 101/58    Temp   36.7 °C (98.1 °F)    Pulse   86   Resp   18    SpO2   99 %      Anesthesia Post Evaluation    Patient location during evaluation: PACU  Patient participation: complete - patient participated  Level of consciousness: awake and alert  Pain score: 0    Airway patency: patent  Anesthetic complications: no  Cardiovascular status: hemodynamically stable  Respiratory status: acceptable  Hydration status: euvolemic    PONV: none          No complications documented.

## 2022-01-28 NOTE — ANESTHESIA PROCEDURE NOTES
Airway    Date/Time: 1/28/2022 9:39 AM  Performed by: Leona Rojas M.D.  Authorized by: Leona Rojas M.D.     Location:  OR  Urgency:  Elective  Difficult Airway: No    Indications for Airway Management:  Anesthesia      Spontaneous Ventilation: absent    Sedation Level:  Deep  Preoxygenated: Yes    Mask Difficulty Assessment:  0 - not attempted  Final Airway Type:  Supraglottic airway  Final Supraglottic Airway:  Standard LMA    SGA Size:  4  Number of Attempts at Approach:  1

## 2022-01-28 NOTE — OP REPORT
DATE OF SERVICE:  1/28/2022    PREOPERATIVE DIAGNOSIS:    1. Abnormal uterine bleeding   2. Pelvic Pain.    POSTOPERATIVE DIAGNOSIS:    1. Abnormal uterine bleeding  2. Pelvic Pain    PROCEDURES PERFORMED:  1.  Dilation and curettage.  2.  Hysteroscopic thermal ablation.    SURGEON:  Misty Sherwood MD    ANESTHESIA:  General endotracheal anesthesia.    ANESTHESIOLOGIST:  Inocente    SPECIMEN:  Endometrial curette.    ESTIMATED BLOOD LOSS:  Less than 5 mL.    FINDINGS:  Proliferative thick endometrial lining. Uterus sounded to 10 cm.    COMPLICATIONS:  None.    PROCEDURE:  After informed consent was obtained, the patient was taken to the   operating room where general endotracheal anesthesia was applied without any   complications.  The patient was then prepped and draped in the usual sterile   manner in the lithotomy position with Porfirio stirrups.  A formal time-out was   called prior to the procedure and preoperative antibiotics were given,   examination under anesthesia was performed and the patient was noted to have a   mildly enlarged uterus. The uterus was sounded to 10 cm. A heavy weighted   Oh speculum was then placed into the posterior vaginal vault and a right angle   retractor anteriorly in order to visualize the cervix.  The cervix was grasped at the   12 o'clock position with a single tooth tenaculum.  The cervical os was then   progressively dilated and a smooth curettage x3 was performed.      Sample was sent to pathology.     The procedure then went to the hydrothermal endometrial ablation.    The cervical os was then progressively dilated further to accommodate the    HTA hysteroscopic system.  The HTA hysteroscope was then introduced   into the uterine cavity under direct visualization and then the procedure was   Started. A hysteroscopic examination was initially performed showing a thick   proliferative endometrium but no injury to the uterus. The ablation was then started   under direct visualization  with the fluid maintained at 87 to 91 C.  The machine   went through 1 cycle with a cool down period without any loss of fluid, perforation   of the uterus or any other abnormalities.  Once the cool down period was performed, the   hysteroscope was then removed under direct visualization and the single tooth   tenaculum was also removed.  There was noted to be some slight bleeding at the   tenaculum site, which was stopped with silver nitrate.      Sponge, needle, instrument, and lap counts were correct x2.  Patient tolerated the procedure   well and went to recovery room in stable condition.       ____________________________________     STEVO ROCA MD

## 2022-03-24 DIAGNOSIS — G47.8 UPPER AIRWAY RESISTANCE SYNDROME: ICD-10-CM

## 2022-03-24 RX ORDER — MONTELUKAST SODIUM 10 MG/1
10 TABLET ORAL EVERY EVENING
Qty: 90 TABLET | Refills: 3 | Status: SHIPPED | OUTPATIENT
Start: 2022-03-24 | End: 2024-01-02

## 2022-05-03 DIAGNOSIS — U07.1 COVID-19: ICD-10-CM

## 2022-05-03 DIAGNOSIS — B37.31 YEAST VAGINITIS: ICD-10-CM

## 2022-05-03 RX ORDER — AZITHROMYCIN 250 MG/1
TABLET, FILM COATED ORAL
Qty: 6 TABLET | Refills: 0 | Status: SHIPPED | OUTPATIENT
Start: 2022-05-03 | End: 2022-09-29

## 2022-05-03 RX ORDER — ALBUTEROL SULFATE 90 UG/1
2 AEROSOL, METERED RESPIRATORY (INHALATION) EVERY 4 HOURS PRN
Qty: 18 G | Refills: 1 | Status: SHIPPED | OUTPATIENT
Start: 2022-05-03 | End: 2022-09-29

## 2022-05-03 RX ORDER — FLUCONAZOLE 100 MG/1
100 TABLET ORAL DAILY
Qty: 3 TABLET | Refills: 0 | Status: SHIPPED | OUTPATIENT
Start: 2022-05-03 | End: 2022-05-06

## 2022-08-18 DIAGNOSIS — F32.1 CURRENT MODERATE EPISODE OF MAJOR DEPRESSIVE DISORDER WITHOUT PRIOR EPISODE (HCC): ICD-10-CM

## 2022-08-19 RX ORDER — BUPROPION HYDROCHLORIDE 150 MG/1
TABLET ORAL
Qty: 90 TABLET | Refills: 0 | Status: SHIPPED | OUTPATIENT
Start: 2022-08-19 | End: 2022-11-17

## 2022-09-13 DIAGNOSIS — E78.2 MIXED DYSLIPIDEMIA: ICD-10-CM

## 2022-09-13 DIAGNOSIS — E55.9 VITAMIN D DEFICIENCY: ICD-10-CM

## 2022-09-13 DIAGNOSIS — Z00.00 HEALTH CARE MAINTENANCE: ICD-10-CM

## 2022-09-22 ENCOUNTER — NON-PROVIDER VISIT (OUTPATIENT)
Dept: INTERNAL MEDICINE | Facility: IMAGING CENTER | Age: 42
End: 2022-09-22
Payer: COMMERCIAL

## 2022-09-22 ENCOUNTER — HOSPITAL ENCOUNTER (OUTPATIENT)
Facility: MEDICAL CENTER | Age: 42
End: 2022-09-22
Attending: FAMILY MEDICINE
Payer: COMMERCIAL

## 2022-09-22 DIAGNOSIS — E55.9 VITAMIN D DEFICIENCY: ICD-10-CM

## 2022-09-22 DIAGNOSIS — Z00.00 HEALTH CARE MAINTENANCE: ICD-10-CM

## 2022-09-22 DIAGNOSIS — E78.2 MIXED DYSLIPIDEMIA: ICD-10-CM

## 2022-09-22 LAB
25(OH)D3 SERPL-MCNC: 47 NG/ML (ref 30–100)
ALBUMIN SERPL BCP-MCNC: 4.9 G/DL (ref 3.2–4.9)
ALBUMIN/GLOB SERPL: 1.8 G/DL
ALP SERPL-CCNC: 78 U/L (ref 30–99)
ALT SERPL-CCNC: 13 U/L (ref 2–50)
ANION GAP SERPL CALC-SCNC: 10 MMOL/L (ref 7–16)
AST SERPL-CCNC: 24 U/L (ref 12–45)
BASOPHILS # BLD AUTO: 0.5 % (ref 0–1.8)
BASOPHILS # BLD: 0.05 K/UL (ref 0–0.12)
BILIRUB SERPL-MCNC: 0.6 MG/DL (ref 0.1–1.5)
BUN SERPL-MCNC: 11 MG/DL (ref 8–22)
CALCIUM SERPL-MCNC: 9.6 MG/DL (ref 8.5–10.5)
CHLORIDE SERPL-SCNC: 102 MMOL/L (ref 96–112)
CHOLEST SERPL-MCNC: 150 MG/DL (ref 100–199)
CO2 SERPL-SCNC: 25 MMOL/L (ref 20–33)
CREAT SERPL-MCNC: 0.73 MG/DL (ref 0.5–1.4)
EOSINOPHIL # BLD AUTO: 0.12 K/UL (ref 0–0.51)
EOSINOPHIL NFR BLD: 1.2 % (ref 0–6.9)
ERYTHROCYTE [DISTWIDTH] IN BLOOD BY AUTOMATED COUNT: 39.6 FL (ref 35.9–50)
EST. AVERAGE GLUCOSE BLD GHB EST-MCNC: 97 MG/DL
GFR SERPLBLD CREATININE-BSD FMLA CKD-EPI: 105 ML/MIN/1.73 M 2
GLOBULIN SER CALC-MCNC: 2.7 G/DL (ref 1.9–3.5)
GLUCOSE SERPL-MCNC: 80 MG/DL (ref 65–99)
HBA1C MFR BLD: 5 % (ref 4–5.6)
HCT VFR BLD AUTO: 46.6 % (ref 37–47)
HDLC SERPL-MCNC: 65 MG/DL
HGB BLD-MCNC: 16.2 G/DL (ref 12–16)
IMM GRANULOCYTES # BLD AUTO: 0.04 K/UL (ref 0–0.11)
IMM GRANULOCYTES NFR BLD AUTO: 0.4 % (ref 0–0.9)
LDLC SERPL CALC-MCNC: 72 MG/DL
LYMPHOCYTES # BLD AUTO: 2.3 K/UL (ref 1–4.8)
LYMPHOCYTES NFR BLD: 22.7 % (ref 22–41)
MCH RBC QN AUTO: 33 PG (ref 27–33)
MCHC RBC AUTO-ENTMCNC: 34.8 G/DL (ref 33.6–35)
MCV RBC AUTO: 94.9 FL (ref 81.4–97.8)
MONOCYTES # BLD AUTO: 0.69 K/UL (ref 0–0.85)
MONOCYTES NFR BLD AUTO: 6.8 % (ref 0–13.4)
NEUTROPHILS # BLD AUTO: 6.91 K/UL (ref 2–7.15)
NEUTROPHILS NFR BLD: 68.4 % (ref 44–72)
NRBC # BLD AUTO: 0 K/UL
NRBC BLD-RTO: 0 /100 WBC
PLATELET # BLD AUTO: 314 K/UL (ref 164–446)
PMV BLD AUTO: 10.7 FL (ref 9–12.9)
POTASSIUM SERPL-SCNC: 4.1 MMOL/L (ref 3.6–5.5)
PROT SERPL-MCNC: 7.6 G/DL (ref 6–8.2)
RBC # BLD AUTO: 4.91 M/UL (ref 4.2–5.4)
SODIUM SERPL-SCNC: 137 MMOL/L (ref 135–145)
TRIGL SERPL-MCNC: 63 MG/DL (ref 0–149)
TSH SERPL DL<=0.005 MIU/L-ACNC: 2.22 UIU/ML (ref 0.38–5.33)
WBC # BLD AUTO: 10.1 K/UL (ref 4.8–10.8)

## 2022-09-22 PROCEDURE — 85025 COMPLETE CBC W/AUTO DIFF WBC: CPT

## 2022-09-22 PROCEDURE — 80061 LIPID PANEL: CPT

## 2022-09-22 PROCEDURE — 80053 COMPREHEN METABOLIC PANEL: CPT

## 2022-09-22 PROCEDURE — 83036 HEMOGLOBIN GLYCOSYLATED A1C: CPT

## 2022-09-22 PROCEDURE — 84443 ASSAY THYROID STIM HORMONE: CPT

## 2022-09-22 PROCEDURE — 82306 VITAMIN D 25 HYDROXY: CPT

## 2022-09-29 ENCOUNTER — OFFICE VISIT (OUTPATIENT)
Dept: INTERNAL MEDICINE | Facility: IMAGING CENTER | Age: 42
End: 2022-09-29
Payer: COMMERCIAL

## 2022-09-29 VITALS
DIASTOLIC BLOOD PRESSURE: 72 MMHG | RESPIRATION RATE: 16 BRPM | TEMPERATURE: 97.9 F | BODY MASS INDEX: 29.86 KG/M2 | WEIGHT: 201.6 LBS | HEART RATE: 84 BPM | OXYGEN SATURATION: 94 % | HEIGHT: 69 IN | SYSTOLIC BLOOD PRESSURE: 126 MMHG

## 2022-09-29 DIAGNOSIS — R87.810 CERVICAL HIGH RISK HUMAN PAPILLOMAVIRUS (HPV) DNA TEST POSITIVE: ICD-10-CM

## 2022-09-29 DIAGNOSIS — F51.01 PRIMARY INSOMNIA: ICD-10-CM

## 2022-09-29 DIAGNOSIS — G47.8 UPPER AIRWAY RESISTANCE SYNDROME: Chronic | ICD-10-CM

## 2022-09-29 DIAGNOSIS — E55.9 VITAMIN D DEFICIENCY: ICD-10-CM

## 2022-09-29 DIAGNOSIS — Z23 NEED FOR VACCINATION: ICD-10-CM

## 2022-09-29 DIAGNOSIS — E78.2 MIXED DYSLIPIDEMIA: Chronic | ICD-10-CM

## 2022-09-29 DIAGNOSIS — N39.3 FEMALE STRESS INCONTINENCE: ICD-10-CM

## 2022-09-29 DIAGNOSIS — G47.33 OSA (OBSTRUCTIVE SLEEP APNEA): ICD-10-CM

## 2022-09-29 DIAGNOSIS — F32.0 CURRENT MILD EPISODE OF MAJOR DEPRESSIVE DISORDER WITHOUT PRIOR EPISODE (HCC): ICD-10-CM

## 2022-09-29 DIAGNOSIS — Z00.00 WELLNESS EXAMINATION: ICD-10-CM

## 2022-09-29 DIAGNOSIS — H91.90: ICD-10-CM

## 2022-09-29 DIAGNOSIS — F41.1 GENERALIZED ANXIETY DISORDER: Chronic | ICD-10-CM

## 2022-09-29 PROCEDURE — 90471 IMMUNIZATION ADMIN: CPT | Performed by: FAMILY MEDICINE

## 2022-09-29 PROCEDURE — 99396 PREV VISIT EST AGE 40-64: CPT | Mod: 25 | Performed by: FAMILY MEDICINE

## 2022-09-29 PROCEDURE — 90686 IIV4 VACC NO PRSV 0.5 ML IM: CPT | Performed by: FAMILY MEDICINE

## 2022-09-29 ASSESSMENT — FIBROSIS 4 INDEX: FIB4 SCORE: 0.89

## 2022-09-29 ASSESSMENT — PATIENT HEALTH QUESTIONNAIRE - PHQ9: CLINICAL INTERPRETATION OF PHQ2 SCORE: 0

## 2022-09-30 NOTE — PROGRESS NOTES
Chief Complaint   Patient presents with    Annual Exam     Lab result review.    Medication Management     Discuss getting off her antidepressant       HPI:  Patient is a 42 y.o. female established patient who presents today for her annual wellness exam and to review labs done 9/22/22. She reports that her overall health has been stable, and she is returning to the workforce part time in the near future. She is UTD with GYN care with Dr. Sherwood, and Vangie GONZALEZ will request reports for her chart. She has history of chronic RTACEY with in frequent Xanax use, and major depressive disorder with ongoing Wellbutrin XL use.  She feels much better overall and would like to wean off of Wellbutrin as tolerated. She has chronic mixed dyslipidemia with nightly Crestor use, and chronic AMELIA/ upper airway resistance syndrome with ongoing autoCPAP use. She will be due for annual screening mammogram in October 2022 and is aware of flu vaccine eligibility. She continues to exercise regularly, remains busy with her family, and is in good spirits today overall.      Patient Active Problem List    Diagnosis Date Noted    Hereditary hearing loss 09/29/2022    Female stress incontinence 04/06/2021    Major depressive disorder 05/27/2020    Primary insomnia 08/19/2019    Cervical high risk human papillomavirus (HPV) DNA test positive 07/15/2019    Vitamin D deficiency 07/12/2019    Mixed dyslipidemia 08/22/2018    TRACEY (generalized anxiety disorder) 11/10/2017    History of recurrent UTI (urinary tract infection) 09/20/2017    Acne vulgaris 09/20/2017    Upper airway resistance syndrome 05/26/2017    AMELIA (obstructive sleep apnea) 04/03/2017       Past medical, surgical, family, and social history was reviewed and updated in Epic chart by me today.     Medications and allergies reviewed and updated in Epic chart by me today.     Lab results 9/22/22 reviewed with patient at visit today.     ROS:  Pertinent positives listed above in HPI. All other  "systems have been reviewed and are negative.    PE:   /72 (BP Location: Left arm, Patient Position: Sitting, BP Cuff Size: Adult)   Pulse 84   Temp 36.6 °C (97.9 °F) (Temporal)   Resp 16   Ht 1.753 m (5' 9\")   Wt 91.4 kg (201 lb 9.6 oz)   SpO2 94%   BMI 29.77 kg/m²   Vital signs reviewed with patient.     Gen: Well developed; well nourished; no acute distress; age appropriate appearance   HEENT: Normocephalic; atraumatic; PEERLA b/l; sclera clear b/l; b/l external auditory canals WNL; b/l TM WNL; nares patent; oropharynx clear; mask in place  Neck: No adenopathy; no thyromegaly  CV: Regular rate and rhythm; S1/ S2 present; no murmur, gallop or rub noted  Pulm: No respiratory distress; clear to ascultation b/l; no wheezing or stridor noted b/l  Abd: Adequate bowel sounds noted; soft and nontender; no rebound, rigidity, nor distention  Extremities: No peripheral edema b/l LE extremities/ no clubbing nor cyanosis noted  Skin: Warm and dry; no rashes noted   Neuro: No focal deficits noted   Psych: AAOx4; mood and affect are appropriate    A/P:  1. Need for vaccination  Vaccine administered at visit today.  - INFLUENZA VACCINE QUAD INJ (PF)    2. TRACEY (generalized anxiety disorder)  Stable per report/ managed with infrequent Xanax use.     3. Mixed dyslipidemia  Stable/ recommend patient continue current Crestor use and healthy lifestyle choices.     4. Upper airway resistance syndrome  Stable/ recommend patient continue current Singulair use.     5. AMELIA (obstructive sleep apnea)  Stable with ongoing CPAP compliance.     6. Vitamin D deficiency  Stable/ recommend patient continue current supplementation.     7. Primary insomnia  Stable per report     8. Current mild episode of major depressive disorder without prior episode (HCC)  Improved per patient report. She is interested in weaning off Wellbutrin as tolerated and we discussed strategies at visit today.     9. Cervical high risk human papillomavirus " (HPV) DNA test positive  Patient is UTD with GYN care with Dr. Sherwood, and Vangie GONZALEZ will request records for review.     10. Female stress incontinence  Stable     11. Hereditary hearing loss  Patient is now wearing bilateral hearing aids and reports improved hearing overall.     12. Wellness examination  Patient is stable overall and remains well informed about medical conditions that need additional attention moving forward.

## 2022-10-15 DIAGNOSIS — E78.2 MIXED DYSLIPIDEMIA: Chronic | ICD-10-CM

## 2022-10-15 RX ORDER — ROSUVASTATIN CALCIUM 10 MG/1
TABLET, COATED ORAL
Qty: 90 TABLET | Refills: 3 | Status: SHIPPED | OUTPATIENT
Start: 2022-10-15

## 2022-11-02 ENCOUNTER — HOSPITAL ENCOUNTER (OUTPATIENT)
Dept: RADIOLOGY | Facility: MEDICAL CENTER | Age: 42
End: 2022-11-02
Attending: FAMILY MEDICINE
Payer: COMMERCIAL

## 2022-11-02 DIAGNOSIS — Z12.31 VISIT FOR SCREENING MAMMOGRAM: ICD-10-CM

## 2022-11-02 PROCEDURE — 77063 BREAST TOMOSYNTHESIS BI: CPT

## 2023-01-04 ENCOUNTER — OFFICE VISIT (OUTPATIENT)
Dept: INTERNAL MEDICINE | Facility: IMAGING CENTER | Age: 43
End: 2023-01-04
Payer: COMMERCIAL

## 2023-01-04 VITALS
RESPIRATION RATE: 17 BRPM | HEART RATE: 70 BPM | WEIGHT: 201.5 LBS | TEMPERATURE: 97.9 F | SYSTOLIC BLOOD PRESSURE: 122 MMHG | OXYGEN SATURATION: 98 % | DIASTOLIC BLOOD PRESSURE: 64 MMHG | HEIGHT: 69 IN | BODY MASS INDEX: 29.84 KG/M2

## 2023-01-04 DIAGNOSIS — J02.9 PHARYNGITIS, UNSPECIFIED ETIOLOGY: ICD-10-CM

## 2023-01-04 DIAGNOSIS — B37.31 YEAST INFECTION OF THE VAGINA: ICD-10-CM

## 2023-01-04 DIAGNOSIS — G47.8 UPPER AIRWAY RESISTANCE SYNDROME: Chronic | ICD-10-CM

## 2023-01-04 PROCEDURE — 99214 OFFICE O/P EST MOD 30 MIN: CPT | Performed by: FAMILY MEDICINE

## 2023-01-04 RX ORDER — FLUCONAZOLE 150 MG/1
150 TABLET ORAL DAILY
Qty: 3 TABLET | Refills: 1 | Status: SHIPPED | OUTPATIENT
Start: 2023-01-04 | End: 2023-01-07

## 2023-01-04 RX ORDER — MONTELUKAST SODIUM 10 MG/1
10 TABLET ORAL DAILY
Qty: 90 TABLET | Refills: 3 | Status: SHIPPED | OUTPATIENT
Start: 2023-01-04 | End: 2023-10-02

## 2023-01-04 RX ORDER — AMOXICILLIN AND CLAVULANATE POTASSIUM 875; 125 MG/1; MG/1
1 TABLET, FILM COATED ORAL 2 TIMES DAILY
Qty: 14 TABLET | Refills: 0 | Status: SHIPPED | OUTPATIENT
Start: 2023-01-04 | End: 2023-01-11

## 2023-01-04 RX ORDER — METHYLPREDNISOLONE 4 MG/1
TABLET ORAL
Qty: 21 TABLET | Refills: 0 | Status: SHIPPED | OUTPATIENT
Start: 2023-01-04 | End: 2023-10-02

## 2023-01-04 ASSESSMENT — FIBROSIS 4 INDEX: FIB4 SCORE: 0.89

## 2023-01-06 ASSESSMENT — PATIENT HEALTH QUESTIONNAIRE - PHQ9
6. FEELING BAD ABOUT YOURSELF - OR THAT YOU ARE A FAILURE OR HAVE LET YOURSELF OR YOUR FAMILY DOWN: NOT AL ALL
9. THOUGHTS THAT YOU WOULD BE BETTER OFF DEAD, OR OF HURTING YOURSELF: NOT AT ALL
4. FEELING TIRED OR HAVING LITTLE ENERGY: NOT AT ALL
8. MOVING OR SPEAKING SO SLOWLY THAT OTHER PEOPLE COULD HAVE NOTICED. OR THE OPPOSITE, BEING SO FIGETY OR RESTLESS THAT YOU HAVE BEEN MOVING AROUND A LOT MORE THAN USUAL: NOT AT ALL
3. TROUBLE FALLING OR STAYING ASLEEP OR SLEEPING TOO MUCH: NOT AT ALL
7. TROUBLE CONCENTRATING ON THINGS, SUCH AS READING THE NEWSPAPER OR WATCHING TELEVISION: NOT AT ALL
1. LITTLE INTEREST OR PLEASURE IN DOING THINGS: NOT AT ALL
SUM OF ALL RESPONSES TO PHQ9 QUESTIONS 1 AND 2: 0
SUM OF ALL RESPONSES TO PHQ QUESTIONS 1-9: 0
2. FEELING DOWN, DEPRESSED, IRRITABLE, OR HOPELESS: NOT AT ALL
5. POOR APPETITE OR OVEREATING: NOT AT ALL

## 2023-01-06 NOTE — PROGRESS NOTES
"Chief Complaint   Patient presents with    Pharyngitis     Sore throat for past couple of weeks. Right sided tonsil has been intermittently swollen and tender - worse at night.     Ear Fullness     Right ear pressure intermittently.        HPI:  Patient is a 42 y.o. female established patient who presents today for evaluation of recurrent tender right lymph node on anterior cervical chain area. She reports this has flared up within the past month without associated N/V/D/F/cough/congestion. She take Ibuprofen PRN for symptom management but does endorse that this happens multiple times per year. Patient is UTD with GYN exam by Dr. Sherwood, and Vangie GONZALEZ will request report for our records.     Patient Active Problem List    Diagnosis Date Noted    Hereditary hearing loss 09/29/2022    Female stress incontinence 04/06/2021    Major depressive disorder 05/27/2020    Primary insomnia 08/19/2019    Cervical high risk human papillomavirus (HPV) DNA test positive 07/15/2019    Vitamin D deficiency 07/12/2019    Mixed dyslipidemia 08/22/2018    TRACEY (generalized anxiety disorder) 11/10/2017    History of recurrent UTI (urinary tract infection) 09/20/2017    Acne vulgaris 09/20/2017    Upper airway resistance syndrome 05/26/2017    AMELIA (obstructive sleep apnea) 04/03/2017       Past medical, surgical, family, and social history was reviewed and updated in Epic chart by me today.     Medications and allergies reviewed and updated in Epic chart by me today.     ROS:  Pertinent positives listed above in HPI. All other systems have been reviewed and are negative.    PE:   /64 (BP Location: Left arm, Patient Position: Sitting, BP Cuff Size: Adult)   Pulse 70   Temp 36.6 °C (97.9 °F) (Temporal)   Resp 17   Ht 1.753 m (5' 9\")   Wt 91.4 kg (201 lb 8 oz)   SpO2 98%   BMI 29.76 kg/m²   Vital signs reviewed with patient.     Gen: Well developed; well nourished; no acute distress; non toxic appearance   HEENT: Normocephalic; " atraumatic; PEERLA b/l; sclera clear b/l; b/l external auditory canals WNL; b/l TM WNL; nares patent; oropharynx clear but right sided tonsils red/inflamed/infected; oral mucosa moist; tongue midline; dentition adequate after mask removed  Neck: No adenopathy with exception of enlarged lymph node mid right anterior cervical; no thyromegaly  CV: Regular rate and rhythm; S1/ S2 present; no murmur, gallop or rub noted  Pulm: No respiratory distress; clear to ascultation b/l; no wheezing or stridor noted b/l  Extremities: No peripheral edema b/l LE extremities/ no clubbing nor cyanosis noted  Skin: Warm and dry; no rashes noted   Neuro: No focal deficits noted   Psych: AAOx4; mood and affect are appropriate    A/P:  1. Pharyngitis, unspecified etiology  Patient has infected right tonsil area that is likely responsible for right cervical adenopathy. Recommend patient start medrol dose pack and Augmentin today and follow clinical response. New RX sent to pharmacy, and I will consider referral to ENT if condition persists.   - methylPREDNISolone (MEDROL DOSEPAK) 4 MG Tablet Therapy Pack; As directed on the packaging label.  Dispense: 21 Tablet; Refill: 0  - amoxicillin-clavulanate (AUGMENTIN) 875-125 MG Tab; Take 1 Tablet by mouth 2 times a day for 7 days.  Dispense: 14 Tablet; Refill: 0    2. Yeast infection of the vagina  Due to pending abx use, patient is at risk for development of vaginal yeast infection. New RX sent to pharmacy, and she can start medication PRN if symptoms develop.   - fluconazole (DIFLUCAN) 150 MG tablet; Take 1 Tablet by mouth every day for 3 days.  Dispense: 3 Tablet; Refill: 1    3. Upper airway resistance syndrome  Stable/ well managed with daily Singulair use. New RX sent to pharmacy.   - montelukast (SINGULAIR) 10 MG Tab; Take 1 Tablet by mouth every day.  Dispense: 90 Tablet; Refill: 3

## 2023-09-22 DIAGNOSIS — Z00.00 HEALTH CARE MAINTENANCE: ICD-10-CM

## 2023-09-22 DIAGNOSIS — E78.2 MIXED DYSLIPIDEMIA: Chronic | ICD-10-CM

## 2023-09-22 DIAGNOSIS — E55.9 VITAMIN D DEFICIENCY: ICD-10-CM

## 2023-09-25 ENCOUNTER — NON-PROVIDER VISIT (OUTPATIENT)
Dept: INTERNAL MEDICINE | Facility: IMAGING CENTER | Age: 43
End: 2023-09-25
Payer: COMMERCIAL

## 2023-09-25 ENCOUNTER — HOSPITAL ENCOUNTER (OUTPATIENT)
Facility: MEDICAL CENTER | Age: 43
End: 2023-09-25
Attending: FAMILY MEDICINE
Payer: COMMERCIAL

## 2023-09-25 DIAGNOSIS — Z23 NEED FOR VACCINATION: ICD-10-CM

## 2023-09-25 DIAGNOSIS — E55.9 VITAMIN D DEFICIENCY: ICD-10-CM

## 2023-09-25 DIAGNOSIS — E78.2 MIXED DYSLIPIDEMIA: Chronic | ICD-10-CM

## 2023-09-25 DIAGNOSIS — Z00.00 HEALTH CARE MAINTENANCE: ICD-10-CM

## 2023-09-25 PROCEDURE — 83036 HEMOGLOBIN GLYCOSYLATED A1C: CPT

## 2023-09-25 PROCEDURE — 84443 ASSAY THYROID STIM HORMONE: CPT

## 2023-09-25 PROCEDURE — 80053 COMPREHEN METABOLIC PANEL: CPT

## 2023-09-25 PROCEDURE — 85025 COMPLETE CBC W/AUTO DIFF WBC: CPT

## 2023-09-25 PROCEDURE — 80061 LIPID PANEL: CPT

## 2023-09-25 PROCEDURE — 90686 IIV4 VACC NO PRSV 0.5 ML IM: CPT | Performed by: INTERNAL MEDICINE

## 2023-09-25 PROCEDURE — 84439 ASSAY OF FREE THYROXINE: CPT

## 2023-09-25 PROCEDURE — 82306 VITAMIN D 25 HYDROXY: CPT

## 2023-09-25 PROCEDURE — 90471 IMMUNIZATION ADMIN: CPT | Performed by: INTERNAL MEDICINE

## 2023-09-26 LAB
25(OH)D3 SERPL-MCNC: 43 NG/ML (ref 30–100)
ALBUMIN SERPL BCP-MCNC: 4.3 G/DL (ref 3.2–4.9)
ALBUMIN/GLOB SERPL: 1.7 G/DL
ALP SERPL-CCNC: 64 U/L (ref 30–99)
ALT SERPL-CCNC: 18 U/L (ref 2–50)
ANION GAP SERPL CALC-SCNC: 8 MMOL/L (ref 7–16)
AST SERPL-CCNC: 23 U/L (ref 12–45)
BASOPHILS # BLD AUTO: 0.5 % (ref 0–1.8)
BASOPHILS # BLD: 0.03 K/UL (ref 0–0.12)
BILIRUB SERPL-MCNC: 0.4 MG/DL (ref 0.1–1.5)
BUN SERPL-MCNC: 11 MG/DL (ref 8–22)
CALCIUM ALBUM COR SERPL-MCNC: 9.3 MG/DL (ref 8.5–10.5)
CALCIUM SERPL-MCNC: 9.5 MG/DL (ref 8.5–10.5)
CHLORIDE SERPL-SCNC: 106 MMOL/L (ref 96–112)
CHOLEST SERPL-MCNC: 138 MG/DL (ref 100–199)
CO2 SERPL-SCNC: 26 MMOL/L (ref 20–33)
CREAT SERPL-MCNC: 0.72 MG/DL (ref 0.5–1.4)
EOSINOPHIL # BLD AUTO: 0.08 K/UL (ref 0–0.51)
EOSINOPHIL NFR BLD: 1.3 % (ref 0–6.9)
ERYTHROCYTE [DISTWIDTH] IN BLOOD BY AUTOMATED COUNT: 42 FL (ref 35.9–50)
EST. AVERAGE GLUCOSE BLD GHB EST-MCNC: 97 MG/DL
GFR SERPLBLD CREATININE-BSD FMLA CKD-EPI: 106 ML/MIN/1.73 M 2
GLOBULIN SER CALC-MCNC: 2.6 G/DL (ref 1.9–3.5)
GLUCOSE SERPL-MCNC: 77 MG/DL (ref 65–99)
HBA1C MFR BLD: 5 % (ref 4–5.6)
HCT VFR BLD AUTO: 45.7 % (ref 37–47)
HDLC SERPL-MCNC: 57 MG/DL
HGB BLD-MCNC: 15.5 G/DL (ref 12–16)
IMM GRANULOCYTES # BLD AUTO: 0.02 K/UL (ref 0–0.11)
IMM GRANULOCYTES NFR BLD AUTO: 0.3 % (ref 0–0.9)
LDLC SERPL CALC-MCNC: 73 MG/DL
LYMPHOCYTES # BLD AUTO: 2.19 K/UL (ref 1–4.8)
LYMPHOCYTES NFR BLD: 36.1 % (ref 22–41)
MCH RBC QN AUTO: 32.3 PG (ref 27–33)
MCHC RBC AUTO-ENTMCNC: 33.9 G/DL (ref 32.2–35.5)
MCV RBC AUTO: 95.2 FL (ref 81.4–97.8)
MONOCYTES # BLD AUTO: 0.53 K/UL (ref 0–0.85)
MONOCYTES NFR BLD AUTO: 8.7 % (ref 0–13.4)
NEUTROPHILS # BLD AUTO: 3.21 K/UL (ref 1.82–7.42)
NEUTROPHILS NFR BLD: 53.1 % (ref 44–72)
NRBC # BLD AUTO: 0 K/UL
NRBC BLD-RTO: 0 /100 WBC (ref 0–0.2)
PLATELET # BLD AUTO: 330 K/UL (ref 164–446)
PMV BLD AUTO: 11.6 FL (ref 9–12.9)
POTASSIUM SERPL-SCNC: 4.2 MMOL/L (ref 3.6–5.5)
PROT SERPL-MCNC: 6.9 G/DL (ref 6–8.2)
RBC # BLD AUTO: 4.8 M/UL (ref 4.2–5.4)
SODIUM SERPL-SCNC: 140 MMOL/L (ref 135–145)
T4 FREE SERPL-MCNC: 1.36 NG/DL (ref 0.93–1.7)
TRIGL SERPL-MCNC: 42 MG/DL (ref 0–149)
TSH SERPL DL<=0.005 MIU/L-ACNC: 1.87 UIU/ML (ref 0.38–5.33)
WBC # BLD AUTO: 6.1 K/UL (ref 4.8–10.8)

## 2023-10-02 ENCOUNTER — OFFICE VISIT (OUTPATIENT)
Dept: INTERNAL MEDICINE | Facility: IMAGING CENTER | Age: 43
End: 2023-10-02
Payer: COMMERCIAL

## 2023-10-02 ENCOUNTER — HOSPITAL ENCOUNTER (OUTPATIENT)
Facility: MEDICAL CENTER | Age: 43
End: 2023-10-02
Attending: FAMILY MEDICINE
Payer: COMMERCIAL

## 2023-10-02 VITALS
OXYGEN SATURATION: 96 % | DIASTOLIC BLOOD PRESSURE: 62 MMHG | WEIGHT: 200 LBS | SYSTOLIC BLOOD PRESSURE: 122 MMHG | HEIGHT: 69 IN | RESPIRATION RATE: 17 BRPM | BODY MASS INDEX: 29.62 KG/M2 | TEMPERATURE: 98.3 F | HEART RATE: 75 BPM

## 2023-10-02 DIAGNOSIS — N95.1 PERIMENOPAUSAL VASOMOTOR SYMPTOMS: ICD-10-CM

## 2023-10-02 DIAGNOSIS — F41.1 GENERALIZED ANXIETY DISORDER: Chronic | ICD-10-CM

## 2023-10-02 DIAGNOSIS — G47.33 OSA (OBSTRUCTIVE SLEEP APNEA): ICD-10-CM

## 2023-10-02 DIAGNOSIS — E78.2 MIXED DYSLIPIDEMIA: Chronic | ICD-10-CM

## 2023-10-02 DIAGNOSIS — F51.01 PRIMARY INSOMNIA: ICD-10-CM

## 2023-10-02 DIAGNOSIS — Z12.31 ENCOUNTER FOR SCREENING MAMMOGRAM FOR BREAST CANCER: ICD-10-CM

## 2023-10-02 DIAGNOSIS — F32.0 CURRENT MILD EPISODE OF MAJOR DEPRESSIVE DISORDER WITHOUT PRIOR EPISODE (HCC): ICD-10-CM

## 2023-10-02 DIAGNOSIS — E55.9 VITAMIN D DEFICIENCY: ICD-10-CM

## 2023-10-02 DIAGNOSIS — Z23 NEED FOR VACCINATION: ICD-10-CM

## 2023-10-02 DIAGNOSIS — Z00.00 WELLNESS EXAMINATION: ICD-10-CM

## 2023-10-02 DIAGNOSIS — H91.90: ICD-10-CM

## 2023-10-02 LAB
FSH SERPL-ACNC: 6.3 MIU/ML
LH SERPL-ACNC: 7 IU/L
PROGEST SERPL-MCNC: 0.45 NG/ML

## 2023-10-02 PROCEDURE — 84270 ASSAY OF SEX HORMONE GLOBUL: CPT

## 2023-10-02 PROCEDURE — 82671 ASSAY OF ESTROGENS: CPT

## 2023-10-02 PROCEDURE — 3074F SYST BP LT 130 MM HG: CPT | Performed by: FAMILY MEDICINE

## 2023-10-02 PROCEDURE — 3078F DIAST BP <80 MM HG: CPT | Performed by: FAMILY MEDICINE

## 2023-10-02 PROCEDURE — 90715 TDAP VACCINE 7 YRS/> IM: CPT | Performed by: FAMILY MEDICINE

## 2023-10-02 PROCEDURE — 99396 PREV VISIT EST AGE 40-64: CPT | Mod: 25 | Performed by: FAMILY MEDICINE

## 2023-10-02 PROCEDURE — 83002 ASSAY OF GONADOTROPIN (LH): CPT

## 2023-10-02 PROCEDURE — 84402 ASSAY OF FREE TESTOSTERONE: CPT

## 2023-10-02 PROCEDURE — 84403 ASSAY OF TOTAL TESTOSTERONE: CPT

## 2023-10-02 PROCEDURE — 90471 IMMUNIZATION ADMIN: CPT | Performed by: FAMILY MEDICINE

## 2023-10-02 PROCEDURE — 83001 ASSAY OF GONADOTROPIN (FSH): CPT

## 2023-10-02 PROCEDURE — 84144 ASSAY OF PROGESTERONE: CPT

## 2023-10-02 RX ORDER — ROSUVASTATIN CALCIUM 10 MG/1
10 TABLET, COATED ORAL EVERY EVENING
Qty: 90 TABLET | Refills: 3 | Status: SHIPPED | OUTPATIENT
Start: 2023-10-02

## 2023-10-02 RX ORDER — ALPRAZOLAM 0.25 MG/1
0.25 TABLET ORAL 2 TIMES DAILY PRN
Qty: 60 TABLET | Refills: 2 | Status: SHIPPED | OUTPATIENT
Start: 2023-10-02 | End: 2023-11-01

## 2023-10-02 ASSESSMENT — FIBROSIS 4 INDEX: FIB4 SCORE: 0.71

## 2023-10-02 ASSESSMENT — PATIENT HEALTH QUESTIONNAIRE - PHQ9: CLINICAL INTERPRETATION OF PHQ2 SCORE: 0

## 2023-10-02 NOTE — PROGRESS NOTES
Chief Complaint   Patient presents with    Annual Exam    Medication Refill     Xanax - [facility]    Immunizations     TDap       HPI:  Patient is a 43 y.o. female established patient who presents today for her annual wellness visit and to review labs done 9/25/23. She is going well overall and denies new mental health concerns. She is eligible for Tdap vaccine, and annual screening mammogram will be due 11/2/23. She is UTD with GYN care with Dr. Sherwood, and she reports increased vasomotor symptoms (hot flashes during daytime hours) as she ages. She has history of prior ablation and appropriately finds it challenging to know what stage she is currently in. She has history of chronic TRACEY/insomnia managed with infrequent Xanax use. She continues to benefit from ongoing Xanax use, denies medication related side effects, and uses this controlled medication in a safe manner.  She has history of major depressive disorder without current Wellbutrin XL use, and no safety concerns present. She has chronic mixed dyslipidemia (coupled with paternal history) with nightly Crestor use, and chronic AMELIA/ upper airway resistance syndrome without current autoCPAP use.  She continues to exercise regularly, is UTD with dermatology care, remains busy with her family, and is in a very happy mood today.     Patient Active Problem List    Diagnosis Date Noted    Hereditary hearing loss 09/29/2022    Female stress incontinence 04/06/2021    Major depressive disorder 05/27/2020    Primary insomnia 08/19/2019    Cervical high risk human papillomavirus (HPV) DNA test positive 07/15/2019    Vitamin D deficiency 07/12/2019    Mixed dyslipidemia 08/22/2018    TRACEY (generalized anxiety disorder) 11/10/2017    History of recurrent UTI (urinary tract infection) 09/20/2017    Acne vulgaris 09/20/2017    Upper airway resistance syndrome 05/26/2017    AMELIA (obstructive sleep apnea) 04/03/2017       Past medical, surgical, family, and social history was reviewed  "and updated in Epic chart by me today.     Medications and allergies reviewed and updated in Epic chart by me today.     Lab results 9/25/23 reviewed with patient at visit today.     ROS:  Pertinent positives listed above in HPI. All other systems have been reviewed and are negative.    PE:   /62 (BP Location: Left arm, Patient Position: Sitting, BP Cuff Size: Adult)   Pulse 75   Temp 36.8 °C (98.3 °F) (Temporal)   Resp 17   Ht 1.753 m (5' 9\")   Wt 90.7 kg (200 lb)   SpO2 96%   BMI 29.53 kg/m²   Vital signs reviewed with patient.     Gen: Well developed; well nourished; no acute distress; age appropriate appearance   HEENT: Normocephalic; atraumatic; PEERLA b/l; sclera clear b/l; b/l external auditory canals WNL; b/l TM WNL; nares patent; oropharynx clear; oral mucosa moist; tongue midline; dentition adequate   Neck: No adenopathy; no thyromegaly  CV: Regular rate and rhythm; S1/ S2 present; no murmur, gallop or rub noted  Pulm: No respiratory distress; clear to ascultation b/l; no wheezing or stridor noted b/l  Abd: Adequate bowel sounds noted; soft and nontender; no rebound, rigidity, nor distention  Extremities: No peripheral edema b/l LE extremities/ no clubbing nor cyanosis noted  Skin: Warm and dry; no rashes noted   Neuro: No focal deficits noted   Psych: AAOx4; mood and affect are appropriate    A/P:  1. Mixed dyslipidemia  Improved overall/ recommend patient continue Crestor 10 mg HS and ongoing active and healthy lifestyle choices. New RX sent to pharmacy.   - rosuvastatin (CRESTOR) 10 MG Tab; Take 1 Tablet by mouth every evening.  Dispense: 90 Tablet; Refill: 3    2. Primary insomnia  Stable/ patient can continue infrequent Xanax use PRN for insomnia management.  reviewed today and no concerns noted. Pt is well versed in safe use of controlled medication, and benefit of use outweighs risk at this time. New RX sent to pharmacy.   - ALPRAZolam (XANAX) 0.25 MG Tab; Take 1 Tablet by mouth 2 " times a day as needed for Sleep or Anxiety for up to 30 days.  Dispense: 60 Tablet; Refill: 2    3. TRACEY (generalized anxiety disorder)  Stable/ patient can continue infrequent Xanax PRN for anxiety management.  reviewed today and no concerns noted. Pt is well versed in safe use of controlled medication, and benefit of use outweighs risk at this time. New RX sent to pharmacy.   - ALPRAZolam (XANAX) 0.25 MG Tab; Take 1 Tablet by mouth 2 times a day as needed for Sleep or Anxiety for up to 30 days.  Dispense: 60 Tablet; Refill: 2    4. Need for vaccination  Vaccine administered at visit today.  - Tdap =>8yo IM    5. Perimenopausal vasomotor symptoms  Refer to HPI for details. Condition discussed at length and patient would like to proceed with hormone testing today. I will follow up with patient when all lab results are available for further management.   - ESTROGENS FRACTIONATED; Future  - PROGESTERONE; Future  - FSH/LH; Future  - TESTOSTERONE F&T FEMALES/CHILD; Future    6. Encounter for screening mammogram for breast cancer  Patient will be due for annual screening mammogram 11/2/23, and she will make imaging appointment accordingly.   - MA-SCREENING MAMMO BILAT W/TOMOSYNTHESIS W/CAD; Future    7. Vitamin D deficiency  Stable/ recommend patient continue Vitamin D supplementation for maintenance.     8. AMELIA (obstructive sleep apnea)  Stable per patient report without current CPAP use.     9. Current mild episode of major depressive disorder without prior episode (HCC)  Not a current concern for patient and she is not currently taking daily Wellbutrin XL.     10. Hereditary hearing loss  Stable/ patient has hearing aids that she wears PRN    11. Wellness examination  Patient is going well overall and remains well informed about medical conditions that need additional attention moving forward.      Anticipatory guidance provided today:  Update medical recommendations as discussed above  Continue healthy diet  choices  Engage in regular physical activity daily and social activities weekly as tolerated   Follow up with me annually and sooner as needed

## 2023-10-05 LAB
ESTRADIOL SERPL HS-MCNC: 118.6 PG/ML
ESTROGEN SERPL CALC-MCNC: 175.3 PG/ML
ESTRONE SERPL-MCNC: 56.7 PG/ML

## 2023-10-06 LAB
SHBG SERPL-SCNC: 56 NMOL/L (ref 25–122)
TESTOST FREE SERPL-MCNC: 1.6 PG/ML (ref 1.1–5.8)
TESTOST SERPL-MCNC: 13 NG/DL (ref 9–55)

## 2023-11-15 ENCOUNTER — HOSPITAL ENCOUNTER (OUTPATIENT)
Dept: RADIOLOGY | Facility: MEDICAL CENTER | Age: 43
End: 2023-11-15
Attending: FAMILY MEDICINE
Payer: COMMERCIAL

## 2023-11-15 DIAGNOSIS — Z12.31 ENCOUNTER FOR SCREENING MAMMOGRAM FOR BREAST CANCER: ICD-10-CM

## 2023-11-15 PROCEDURE — 77063 BREAST TOMOSYNTHESIS BI: CPT

## 2024-01-02 DIAGNOSIS — G47.8 UPPER AIRWAY RESISTANCE SYNDROME: ICD-10-CM

## 2024-01-02 RX ORDER — MONTELUKAST SODIUM 10 MG/1
10 TABLET ORAL
Qty: 90 TABLET | Refills: 3 | Status: SHIPPED | OUTPATIENT
Start: 2024-01-02

## 2024-02-26 DIAGNOSIS — B37.9 YEAST INFECTION: ICD-10-CM

## 2024-02-26 RX ORDER — FLUCONAZOLE 150 MG/1
150 TABLET ORAL DAILY
Qty: 3 TABLET | Refills: 0 | Status: SHIPPED | OUTPATIENT
Start: 2024-02-26 | End: 2024-02-29

## 2024-03-25 ENCOUNTER — OFFICE VISIT (OUTPATIENT)
Dept: INTERNAL MEDICINE | Facility: IMAGING CENTER | Age: 44
End: 2024-03-25
Payer: COMMERCIAL

## 2024-03-25 VITALS
WEIGHT: 199.96 LBS | HEART RATE: 87 BPM | HEIGHT: 69 IN | RESPIRATION RATE: 17 BRPM | OXYGEN SATURATION: 98 % | SYSTOLIC BLOOD PRESSURE: 118 MMHG | DIASTOLIC BLOOD PRESSURE: 62 MMHG | TEMPERATURE: 97.9 F | BODY MASS INDEX: 29.62 KG/M2

## 2024-03-25 DIAGNOSIS — H10.9 BACTERIAL CONJUNCTIVITIS: ICD-10-CM

## 2024-03-25 PROCEDURE — 3078F DIAST BP <80 MM HG: CPT | Performed by: FAMILY MEDICINE

## 2024-03-25 PROCEDURE — 99213 OFFICE O/P EST LOW 20 MIN: CPT | Performed by: FAMILY MEDICINE

## 2024-03-25 PROCEDURE — 3074F SYST BP LT 130 MM HG: CPT | Performed by: FAMILY MEDICINE

## 2024-03-25 RX ORDER — OFLOXACIN 3 MG/ML
1 SOLUTION/ DROPS OPHTHALMIC 4 TIMES DAILY
Qty: 5 ML | Refills: 0 | Status: SHIPPED | OUTPATIENT
Start: 2024-03-25 | End: 2024-04-01

## 2024-03-25 ASSESSMENT — PATIENT HEALTH QUESTIONNAIRE - PHQ9: CLINICAL INTERPRETATION OF PHQ2 SCORE: 0

## 2024-03-25 ASSESSMENT — FIBROSIS 4 INDEX: FIB4 SCORE: 0.71

## 2024-03-25 NOTE — PROGRESS NOTES
"Chief Complaint   Patient presents with    Eye Problem     Left eye redness. This Friday didn't feel very well, HA and sore throat.        HPI:  Patient is a 43 y.o. female established patient who presents today for evaluation of new left eye redness, itching, discharge that has significantly worsened overnight. On Friday, she did not feel well overall (nasal congestion, sore throat, headache) and then developed mild left eye redness and symptoms listed above. She denies associated N/V/D/F and overall condition has improved with exception of her left eye.     Patient Active Problem List    Diagnosis Date Noted    Hereditary hearing loss 09/29/2022    Female stress incontinence 04/06/2021    Major depressive disorder 05/27/2020    Primary insomnia 08/19/2019    Cervical high risk human papillomavirus (HPV) DNA test positive 07/15/2019    Vitamin D deficiency 07/12/2019    Mixed dyslipidemia 08/22/2018    TRACEY (generalized anxiety disorder) 11/10/2017    History of recurrent UTI (urinary tract infection) 09/20/2017    Acne vulgaris 09/20/2017    Upper airway resistance syndrome 05/26/2017    AMELIA (obstructive sleep apnea) 04/03/2017       Past medical, surgical, family, and social history was reviewed and updated in Epic chart by me today.     Medications and allergies reviewed and updated in Epic chart by me today.     ROS:  Pertinent positives listed above in HPI. All other systems have been reviewed and are negative.    PE:   /62 (BP Location: Left arm, Patient Position: Sitting, BP Cuff Size: Adult)   Pulse 87   Temp 36.6 °C (97.9 °F) (Temporal)   Resp 17   Ht 1.753 m (5' 9\")   Wt 90.7 kg (199 lb 15.3 oz)   SpO2 98%   BMI 29.53 kg/m²   Vital signs reviewed with patient.     Gen: Well developed; well nourished; no acute distress; non toxic appearance   HEENT: Normocephalic; atraumatic; PEERLA b/l; L sclera bright red with matter on eyelashes; R sclera clear; b/l external auditory canals WNL; b/l TM WNL; " nares patent; oropharynx clear; oral mucosa moist; tongue midline; dentition adequate; mild congestion   Neck: No adenopathy; no thyromegaly  CV: Regular rate and rhythm; S1/ S2 present; no murmur, gallop or rub noted  Pulm: No respiratory distress; clear to ascultation b/l; no wheezing or stridor noted b/l  Skin: Warm and dry; no rashes noted   Neuro: No focal deficits noted   Psych: AAOx4; mood and affect are appropriate    A/P:  1. Bacterial conjunctivitis  New condition as described above in HPI. Discussed bacterial versus viral origin of condition, and recommend patient start eye drops today, wash all affected clothing/ bed linens/towels in hot water and soap, practice good hand hygiene, and follow clinical response. New RX sent to pharmacy.   - ofloxacin (OCUFLOX) 0.3 % Solution; Administer 1 Drop into both eyes 4 times a day for 7 days.  Dispense: 5 mL; Refill: 0

## 2024-04-24 ENCOUNTER — OFFICE VISIT (OUTPATIENT)
Dept: INTERNAL MEDICINE | Facility: IMAGING CENTER | Age: 44
End: 2024-04-24
Payer: COMMERCIAL

## 2024-04-24 VITALS
TEMPERATURE: 98.6 F | BODY MASS INDEX: 29.62 KG/M2 | WEIGHT: 199.96 LBS | OXYGEN SATURATION: 98 % | HEART RATE: 83 BPM | SYSTOLIC BLOOD PRESSURE: 118 MMHG | DIASTOLIC BLOOD PRESSURE: 62 MMHG | HEIGHT: 69 IN | RESPIRATION RATE: 17 BRPM

## 2024-04-24 DIAGNOSIS — M79.672 BILATERAL FOOT PAIN: ICD-10-CM

## 2024-04-24 DIAGNOSIS — M79.671 BILATERAL FOOT PAIN: ICD-10-CM

## 2024-04-24 PROCEDURE — 99213 OFFICE O/P EST LOW 20 MIN: CPT | Performed by: FAMILY MEDICINE

## 2024-04-24 PROCEDURE — 3074F SYST BP LT 130 MM HG: CPT | Performed by: FAMILY MEDICINE

## 2024-04-24 PROCEDURE — 3078F DIAST BP <80 MM HG: CPT | Performed by: FAMILY MEDICINE

## 2024-04-24 ASSESSMENT — FIBROSIS 4 INDEX: FIB4 SCORE: 0.71

## 2024-04-24 NOTE — PROGRESS NOTES
"Chief Complaint   Patient presents with    Foot Problem     Bilateral feet - balls of feet are very painful when she walks barefoot.       HPI:  Patient is a 43 y.o. female established patient who presents today for evaluation of bilateral foot pain that started to become increasing present over the past couple of weeks. She reports long standing history of bilateral arch pain/heel problems and pain that occurs on intermittent basis. She reports currently experiencing pain near balls of her feet only when walking barefoot (her preferred method). She currently is wearing well structured sneakers and does not have significant pain at this time.     Patient Active Problem List    Diagnosis Date Noted    Hereditary hearing loss 09/29/2022    Female stress incontinence 04/06/2021    Major depressive disorder 05/27/2020    Primary insomnia 08/19/2019    Cervical high risk human papillomavirus (HPV) DNA test positive 07/15/2019    Vitamin D deficiency 07/12/2019    Mixed dyslipidemia 08/22/2018    TRACEY (generalized anxiety disorder) 11/10/2017    History of recurrent UTI (urinary tract infection) 09/20/2017    Acne vulgaris 09/20/2017    Upper airway resistance syndrome 05/26/2017    AMELIA (obstructive sleep apnea) 04/03/2017       Past medical, surgical, family, and social history was reviewed and updated in Epic chart by me today.     Medications and allergies reviewed and updated in Epic chart by me today.     ROS:  Pertinent positives listed above in HPI. All other systems have been reviewed and are negative.    PE:   /62 (BP Location: Left arm, Patient Position: Sitting, BP Cuff Size: Adult)   Pulse 83   Temp 37 °C (98.6 °F) (Temporal)   Resp 17   Ht 1.753 m (5' 9\")   Wt 90.7 kg (199 lb 15.3 oz)   SpO2 98%   BMI 29.53 kg/m²   Vital signs reviewed with patient.     Gen: Well developed; well nourished; no acute distress; age appropriate appearance   Lower extremities: No peripheral edema b/l LE extremities/ no " clubbing nor cyanosis noted; no reproducible pain with palpation over plantar fascia distribution/ distal aspect of each foot is sore to touch without associated edema nor overlying skin changes  Skin: Warm and dry; no rashes noted   Neuro: No focal deficits noted; independent ambulation observed   Psych: AAOx4; mood and affect are appropriate    A/P:  1. Bilateral foot pain  Worsening condition as described above in HPI. Differential diagnosis discussed with patient, and I suspect plantar fasciitis as one of the main underlying etiologies. Recommend patient avoid walking barefoot/avoid wearing flat flip flops, ice all affected areas regularly, do gentle stretching daily, wear supportive shoes at all times, and see podiatry for further evaluation and treatment recommendations. New referral made to Dr. Mitchell, and patient provided with his office information for appointment scheduling.   - Referral to Podiatry

## 2024-04-28 DIAGNOSIS — G47.09 SECONDARY INSOMNIA: ICD-10-CM

## 2024-04-28 DIAGNOSIS — N39.3 FEMALE STRESS INCONTINENCE: ICD-10-CM

## 2024-04-28 RX ORDER — ZOLPIDEM TARTRATE 5 MG/1
5 TABLET ORAL NIGHTLY PRN
Qty: 4 TABLET | Refills: 0 | Status: SHIPPED | OUTPATIENT
Start: 2024-04-28 | End: 2024-05-02

## 2024-04-28 RX ORDER — OXYBUTYNIN CHLORIDE 5 MG/1
5 TABLET ORAL 3 TIMES DAILY
Qty: 90 TABLET | Refills: 3 | Status: SHIPPED | OUTPATIENT
Start: 2024-04-28

## 2024-09-03 ENCOUNTER — OFFICE VISIT (OUTPATIENT)
Dept: INTERNAL MEDICINE | Facility: IMAGING CENTER | Age: 44
End: 2024-09-03
Payer: COMMERCIAL

## 2024-09-03 VITALS
SYSTOLIC BLOOD PRESSURE: 110 MMHG | RESPIRATION RATE: 14 BRPM | DIASTOLIC BLOOD PRESSURE: 70 MMHG | TEMPERATURE: 97 F | OXYGEN SATURATION: 99 % | HEART RATE: 71 BPM

## 2024-09-03 DIAGNOSIS — N95.1 PERIMENOPAUSAL SYMPTOMS: ICD-10-CM

## 2024-09-03 DIAGNOSIS — Z00.00 HEALTH CARE MAINTENANCE: ICD-10-CM

## 2024-09-03 DIAGNOSIS — R21 SKIN RASH: ICD-10-CM

## 2024-09-03 PROCEDURE — 99214 OFFICE O/P EST MOD 30 MIN: CPT | Performed by: FAMILY MEDICINE

## 2024-09-03 PROCEDURE — 3074F SYST BP LT 130 MM HG: CPT | Performed by: FAMILY MEDICINE

## 2024-09-03 PROCEDURE — 3078F DIAST BP <80 MM HG: CPT | Performed by: FAMILY MEDICINE

## 2024-09-03 RX ORDER — KETOCONAZOLE 20 MG/G
1 CREAM TOPICAL 2 TIMES DAILY
Qty: 15 G | Refills: 1 | Status: SHIPPED | OUTPATIENT
Start: 2024-09-03

## 2024-09-09 PROBLEM — N95.1 PERIMENOPAUSAL SYMPTOMS: Status: ACTIVE | Noted: 2024-09-09

## 2024-09-09 NOTE — PROGRESS NOTES
Chief Complaint   Patient presents with    Skin Lesion     On hands       HPI:  Patient is a 43 y.o. female established patient who presents today for evaluation of new skin lesions and skin rash on her hands and left thumb. She has noticed recent appearance of pinpoint skin lesions that can itch/burn at times on both hands without associated trigger. She has also developed circular skin lesion on left thumb and is wondering if it is ring worm. She is also experiencing perimenopausal symptoms: fluctuating emotions/ sadness for no reason at times, inability to lose weight, increase hot flashes and is interested in potential management options.     Patient Active Problem List    Diagnosis Date Noted    Perimenopausal symptoms 09/09/2024    Hereditary hearing loss 09/29/2022    Female stress incontinence 04/06/2021    Major depressive disorder 05/27/2020    Primary insomnia 08/19/2019    Cervical high risk human papillomavirus (HPV) DNA test positive 07/15/2019    Vitamin D deficiency 07/12/2019    Mixed dyslipidemia 08/22/2018    TRACEY (generalized anxiety disorder) 11/10/2017    History of recurrent UTI (urinary tract infection) 09/20/2017    Acne vulgaris 09/20/2017    Upper airway resistance syndrome 05/26/2017    AMELIA (obstructive sleep apnea) 04/03/2017       Past medical, surgical, family, and social history was reviewed and updated in Epic chart by me today.     Medications and allergies reviewed and updated in Epic chart by me today.     ROS:  Pertinent positives listed above in HPI. All other systems have been reviewed and are negative.    PE:   /70   Pulse 71   Temp 36.1 °C (97 °F)   Resp 14   SpO2 99%   Vital signs reviewed with patient.     Gen: Well developed; well nourished; no acute distress; age appropriate appearance   Extremities: No peripheral edema b/l LE extremities/ no clubbing nor cyanosis noted  Skin: Warm and dry; small raised circular rash on left thumb; pinpoint white skin lesions  noted on both hands (may be contact from weight lifting equipment but benign at this time)  Neuro: No focal deficits noted   Psych: AAOx4; mood and affect are appropriate    A/P:  1. Skin rash  New lesion on left thumb as described above in HPI. Recommend patient apply Ketoconazole cream BID and follow response. New RX sent to pharmacy.   - ketoconazole (NIZORAL) 2 % Cream; Apply 1 Application topically 2 times a day.  Dispense: 15 g; Refill: 1    2. Perimenopausal symptoms  Patient is experiencing typical perimenopausal symptoms and we discussed option to explore HRT (local clinic resources provided to patient to consider).     3. Health care maintenance  Patient is due for annual fasting labs for ongoing medical management.

## 2024-09-18 DIAGNOSIS — E78.2 MIXED DYSLIPIDEMIA: ICD-10-CM

## 2024-09-18 DIAGNOSIS — E55.9 VITAMIN D DEFICIENCY: ICD-10-CM

## 2024-09-18 DIAGNOSIS — Z00.00 HEALTH CARE MAINTENANCE: ICD-10-CM

## 2024-09-23 ENCOUNTER — HOSPITAL ENCOUNTER (OUTPATIENT)
Facility: MEDICAL CENTER | Age: 44
End: 2024-09-23
Attending: FAMILY MEDICINE
Payer: COMMERCIAL

## 2024-09-23 ENCOUNTER — NON-PROVIDER VISIT (OUTPATIENT)
Dept: INTERNAL MEDICINE | Facility: IMAGING CENTER | Age: 44
End: 2024-09-23
Payer: COMMERCIAL

## 2024-09-23 DIAGNOSIS — Z00.00 HEALTH CARE MAINTENANCE: ICD-10-CM

## 2024-09-23 DIAGNOSIS — E55.9 VITAMIN D DEFICIENCY: ICD-10-CM

## 2024-09-23 DIAGNOSIS — E78.2 MIXED DYSLIPIDEMIA: ICD-10-CM

## 2024-09-23 LAB
25(OH)D3 SERPL-MCNC: 41 NG/ML (ref 30–100)
ALBUMIN SERPL BCP-MCNC: 4.3 G/DL (ref 3.2–4.9)
ALBUMIN/GLOB SERPL: 2.2 G/DL
ALP SERPL-CCNC: 61 U/L (ref 30–99)
ALT SERPL-CCNC: 14 U/L (ref 2–50)
ANION GAP SERPL CALC-SCNC: 9 MMOL/L (ref 7–16)
AST SERPL-CCNC: 23 U/L (ref 12–45)
BASOPHILS # BLD AUTO: 0.7 % (ref 0–1.8)
BASOPHILS # BLD: 0.05 K/UL (ref 0–0.12)
BILIRUB SERPL-MCNC: 0.6 MG/DL (ref 0.1–1.5)
BUN SERPL-MCNC: 10 MG/DL (ref 8–22)
CALCIUM ALBUM COR SERPL-MCNC: 9.3 MG/DL (ref 8.5–10.5)
CALCIUM SERPL-MCNC: 9.5 MG/DL (ref 8.5–10.5)
CHLORIDE SERPL-SCNC: 103 MMOL/L (ref 96–112)
CHOLEST SERPL-MCNC: 159 MG/DL (ref 100–199)
CO2 SERPL-SCNC: 26 MMOL/L (ref 20–33)
CREAT SERPL-MCNC: 0.71 MG/DL (ref 0.5–1.4)
EOSINOPHIL # BLD AUTO: 0.07 K/UL (ref 0–0.51)
EOSINOPHIL NFR BLD: 0.9 % (ref 0–6.9)
ERYTHROCYTE [DISTWIDTH] IN BLOOD BY AUTOMATED COUNT: 40.6 FL (ref 35.9–50)
EST. AVERAGE GLUCOSE BLD GHB EST-MCNC: 105 MG/DL
GFR SERPLBLD CREATININE-BSD FMLA CKD-EPI: 107 ML/MIN/1.73 M 2
GLOBULIN SER CALC-MCNC: 2 G/DL (ref 1.9–3.5)
GLUCOSE SERPL-MCNC: 80 MG/DL (ref 65–99)
HBA1C MFR BLD: 5.3 % (ref 4–5.6)
HCT VFR BLD AUTO: 45.1 % (ref 37–47)
HDLC SERPL-MCNC: 57 MG/DL
HGB BLD-MCNC: 15.5 G/DL (ref 12–16)
IMM GRANULOCYTES # BLD AUTO: 0.02 K/UL (ref 0–0.11)
IMM GRANULOCYTES NFR BLD AUTO: 0.3 % (ref 0–0.9)
LDLC SERPL CALC-MCNC: 86 MG/DL
LYMPHOCYTES # BLD AUTO: 2.33 K/UL (ref 1–4.8)
LYMPHOCYTES NFR BLD: 30.9 % (ref 22–41)
MCH RBC QN AUTO: 32.6 PG (ref 27–33)
MCHC RBC AUTO-ENTMCNC: 34.4 G/DL (ref 32.2–35.5)
MCV RBC AUTO: 94.7 FL (ref 81.4–97.8)
MONOCYTES # BLD AUTO: 0.63 K/UL (ref 0–0.85)
MONOCYTES NFR BLD AUTO: 8.4 % (ref 0–13.4)
NEUTROPHILS # BLD AUTO: 4.44 K/UL (ref 1.82–7.42)
NEUTROPHILS NFR BLD: 58.8 % (ref 44–72)
NRBC # BLD AUTO: 0 K/UL
NRBC BLD-RTO: 0 /100 WBC (ref 0–0.2)
PLATELET # BLD AUTO: 288 K/UL (ref 164–446)
PMV BLD AUTO: 10.6 FL (ref 9–12.9)
POTASSIUM SERPL-SCNC: 4 MMOL/L (ref 3.6–5.5)
PROT SERPL-MCNC: 6.3 G/DL (ref 6–8.2)
RBC # BLD AUTO: 4.76 M/UL (ref 4.2–5.4)
SODIUM SERPL-SCNC: 138 MMOL/L (ref 135–145)
T4 FREE SERPL-MCNC: 1.32 NG/DL (ref 0.93–1.7)
TRIGL SERPL-MCNC: 81 MG/DL (ref 0–149)
TSH SERPL-ACNC: 2.24 UIU/ML (ref 0.35–5.5)
WBC # BLD AUTO: 7.5 K/UL (ref 4.8–10.8)

## 2024-09-23 PROCEDURE — 80061 LIPID PANEL: CPT

## 2024-09-23 PROCEDURE — 85025 COMPLETE CBC W/AUTO DIFF WBC: CPT

## 2024-09-23 PROCEDURE — 84443 ASSAY THYROID STIM HORMONE: CPT

## 2024-09-23 PROCEDURE — 84439 ASSAY OF FREE THYROXINE: CPT

## 2024-09-23 PROCEDURE — 80053 COMPREHEN METABOLIC PANEL: CPT

## 2024-09-23 PROCEDURE — 82306 VITAMIN D 25 HYDROXY: CPT

## 2024-09-23 PROCEDURE — 83036 HEMOGLOBIN GLYCOSYLATED A1C: CPT

## 2024-09-25 DIAGNOSIS — G47.8 UPPER AIRWAY RESISTANCE SYNDROME: ICD-10-CM

## 2024-09-25 RX ORDER — MONTELUKAST SODIUM 10 MG/1
10 TABLET ORAL
Qty: 90 TABLET | Refills: 3 | Status: SHIPPED | OUTPATIENT
Start: 2024-09-25

## 2024-10-04 ENCOUNTER — OFFICE VISIT (OUTPATIENT)
Dept: INTERNAL MEDICINE | Facility: IMAGING CENTER | Age: 44
End: 2024-10-04
Payer: COMMERCIAL

## 2024-10-04 VITALS
RESPIRATION RATE: 17 BRPM | HEART RATE: 90 BPM | TEMPERATURE: 97.9 F | SYSTOLIC BLOOD PRESSURE: 122 MMHG | HEIGHT: 69 IN | OXYGEN SATURATION: 96 % | BODY MASS INDEX: 30.51 KG/M2 | DIASTOLIC BLOOD PRESSURE: 60 MMHG | WEIGHT: 206 LBS

## 2024-10-04 DIAGNOSIS — Z12.31 BREAST CANCER SCREENING BY MAMMOGRAM: ICD-10-CM

## 2024-10-04 DIAGNOSIS — F32.0 CURRENT MILD EPISODE OF MAJOR DEPRESSIVE DISORDER WITHOUT PRIOR EPISODE (HCC): ICD-10-CM

## 2024-10-04 DIAGNOSIS — Z23 NEED FOR VACCINATION: ICD-10-CM

## 2024-10-04 DIAGNOSIS — E55.9 VITAMIN D DEFICIENCY: ICD-10-CM

## 2024-10-04 DIAGNOSIS — N95.1 PERIMENOPAUSAL SYMPTOMS: ICD-10-CM

## 2024-10-04 DIAGNOSIS — G47.8 UPPER AIRWAY RESISTANCE SYNDROME: Chronic | ICD-10-CM

## 2024-10-04 DIAGNOSIS — E78.2 MIXED DYSLIPIDEMIA: Chronic | ICD-10-CM

## 2024-10-04 DIAGNOSIS — Z00.00 WELLNESS EXAMINATION: ICD-10-CM

## 2024-10-04 DIAGNOSIS — G47.33 OSA (OBSTRUCTIVE SLEEP APNEA): ICD-10-CM

## 2024-10-04 DIAGNOSIS — F51.01 PRIMARY INSOMNIA: ICD-10-CM

## 2024-10-04 DIAGNOSIS — F41.1 GENERALIZED ANXIETY DISORDER: Chronic | ICD-10-CM

## 2024-10-04 PROCEDURE — 99396 PREV VISIT EST AGE 40-64: CPT | Mod: 25 | Performed by: FAMILY MEDICINE

## 2024-10-04 PROCEDURE — 90656 IIV3 VACC NO PRSV 0.5 ML IM: CPT | Performed by: FAMILY MEDICINE

## 2024-10-04 PROCEDURE — 90471 IMMUNIZATION ADMIN: CPT | Performed by: FAMILY MEDICINE

## 2024-10-04 RX ORDER — ALPRAZOLAM 0.25 MG/1
0.25 TABLET ORAL 2 TIMES DAILY PRN
Qty: 180 TABLET | Refills: 0 | Status: SHIPPED | OUTPATIENT
Start: 2024-10-04 | End: 2025-01-02

## 2024-10-04 ASSESSMENT — PATIENT HEALTH QUESTIONNAIRE - PHQ9: CLINICAL INTERPRETATION OF PHQ2 SCORE: 0

## 2024-10-04 ASSESSMENT — FIBROSIS 4 INDEX: FIB4 SCORE: 0.94

## 2024-10-30 ENCOUNTER — OFFICE VISIT (OUTPATIENT)
Dept: INTERNAL MEDICINE | Facility: IMAGING CENTER | Age: 44
End: 2024-10-30
Payer: COMMERCIAL

## 2024-10-30 VITALS
HEART RATE: 82 BPM | SYSTOLIC BLOOD PRESSURE: 118 MMHG | WEIGHT: 205.91 LBS | HEIGHT: 69 IN | RESPIRATION RATE: 17 BRPM | TEMPERATURE: 97.9 F | OXYGEN SATURATION: 95 % | BODY MASS INDEX: 30.5 KG/M2 | DIASTOLIC BLOOD PRESSURE: 62 MMHG

## 2024-10-30 DIAGNOSIS — R10.30 RECURRENT LOWER ABDOMINAL PAIN: ICD-10-CM

## 2024-10-30 ASSESSMENT — FIBROSIS 4 INDEX: FIB4 SCORE: 0.94

## 2024-10-31 ENCOUNTER — HOSPITAL ENCOUNTER (OUTPATIENT)
Dept: RADIOLOGY | Facility: MEDICAL CENTER | Age: 44
End: 2024-10-31
Attending: FAMILY MEDICINE
Payer: COMMERCIAL

## 2024-10-31 DIAGNOSIS — R10.30 RECURRENT LOWER ABDOMINAL PAIN: ICD-10-CM

## 2024-10-31 PROCEDURE — 76700 US EXAM ABDOM COMPLETE: CPT

## 2024-11-19 ENCOUNTER — HOSPITAL ENCOUNTER (OUTPATIENT)
Dept: RADIOLOGY | Facility: MEDICAL CENTER | Age: 44
End: 2024-11-19
Attending: FAMILY MEDICINE
Payer: COMMERCIAL

## 2024-11-19 DIAGNOSIS — Z12.31 BREAST CANCER SCREENING BY MAMMOGRAM: ICD-10-CM

## 2024-11-19 PROCEDURE — 77067 SCR MAMMO BI INCL CAD: CPT

## 2024-11-20 DIAGNOSIS — L70.0 ACNE VULGARIS: Chronic | ICD-10-CM

## 2024-11-20 RX ORDER — SPIRONOLACTONE 50 MG/1
50 TABLET, FILM COATED ORAL 2 TIMES DAILY
Qty: 28 TABLET | Refills: 1 | Status: SHIPPED | OUTPATIENT
Start: 2024-11-20 | End: 2024-11-20 | Stop reason: SDUPTHER

## 2024-11-20 RX ORDER — SPIRONOLACTONE 50 MG/1
50 TABLET, FILM COATED ORAL 2 TIMES DAILY
Qty: 28 TABLET | Refills: 0 | Status: SHIPPED | OUTPATIENT
Start: 2024-11-20 | End: 2024-12-04

## 2024-11-20 RX ORDER — SPIRONOLACTONE 50 MG/1
50 TABLET, FILM COATED ORAL 2 TIMES DAILY
Qty: 180 TABLET | Refills: 3 | Status: SHIPPED | OUTPATIENT
Start: 2024-11-20 | End: 2024-11-20 | Stop reason: SDUPTHER

## 2024-12-04 DIAGNOSIS — E78.2 MIXED DYSLIPIDEMIA: Chronic | ICD-10-CM

## 2024-12-04 RX ORDER — ROSUVASTATIN CALCIUM 10 MG/1
10 TABLET, COATED ORAL
Qty: 90 TABLET | Refills: 3 | Status: SHIPPED | OUTPATIENT
Start: 2024-12-04 | End: 2024-12-31 | Stop reason: SDUPTHER

## 2024-12-10 ENCOUNTER — HOSPITAL ENCOUNTER (OUTPATIENT)
Facility: MEDICAL CENTER | Age: 44
End: 2024-12-10
Attending: OBSTETRICS & GYNECOLOGY
Payer: COMMERCIAL

## 2024-12-10 ENCOUNTER — NON-PROVIDER VISIT (OUTPATIENT)
Dept: INTERNAL MEDICINE | Facility: IMAGING CENTER | Age: 44
End: 2024-12-10
Payer: COMMERCIAL

## 2024-12-10 LAB
LH SERPL-ACNC: 15.8 IU/L
PROLACTIN SERPL-MCNC: 22 NG/ML (ref 2.8–26)

## 2024-12-10 PROCEDURE — 83002 ASSAY OF GONADOTROPIN (LH): CPT

## 2024-12-10 PROCEDURE — 83001 ASSAY OF GONADOTROPIN (FSH): CPT

## 2024-12-10 PROCEDURE — 99999 PR NO CHARGE: CPT

## 2024-12-10 PROCEDURE — 84443 ASSAY THYROID STIM HORMONE: CPT

## 2024-12-10 PROCEDURE — 84439 ASSAY OF FREE THYROXINE: CPT

## 2024-12-10 PROCEDURE — 84146 ASSAY OF PROLACTIN: CPT

## 2024-12-11 LAB
FSH SERPL-ACNC: 11.5 MIU/ML
T4 FREE SERPL-MCNC: 1.29 NG/DL (ref 0.93–1.7)
TSH SERPL-ACNC: 1.67 UIU/ML (ref 0.35–5.5)

## 2024-12-31 DIAGNOSIS — E78.2 MIXED DYSLIPIDEMIA: Chronic | ICD-10-CM

## 2024-12-31 RX ORDER — ROSUVASTATIN CALCIUM 10 MG/1
10 TABLET, COATED ORAL
Qty: 90 TABLET | Refills: 3 | Status: SHIPPED | OUTPATIENT
Start: 2024-12-31

## 2025-05-29 ENCOUNTER — OFFICE VISIT (OUTPATIENT)
Dept: INTERNAL MEDICINE | Facility: IMAGING CENTER | Age: 45
End: 2025-05-29
Payer: COMMERCIAL

## 2025-05-29 VITALS
OXYGEN SATURATION: 98 % | HEART RATE: 83 BPM | SYSTOLIC BLOOD PRESSURE: 122 MMHG | TEMPERATURE: 97.8 F | RESPIRATION RATE: 17 BRPM | DIASTOLIC BLOOD PRESSURE: 62 MMHG | BODY MASS INDEX: 30.07 KG/M2 | WEIGHT: 203 LBS | HEIGHT: 69 IN

## 2025-05-29 DIAGNOSIS — E66.9 OBESITY (BMI 30-39.9): Primary | ICD-10-CM

## 2025-05-29 PROCEDURE — 3078F DIAST BP <80 MM HG: CPT | Performed by: FAMILY MEDICINE

## 2025-05-29 PROCEDURE — 99213 OFFICE O/P EST LOW 20 MIN: CPT | Performed by: FAMILY MEDICINE

## 2025-05-29 PROCEDURE — 3074F SYST BP LT 130 MM HG: CPT | Performed by: FAMILY MEDICINE

## 2025-05-29 RX ORDER — SPIRONOLACTONE 50 MG/1
TABLET, FILM COATED ORAL
COMMUNITY
Start: 2025-03-26

## 2025-05-29 ASSESSMENT — FIBROSIS 4 INDEX: FIB4 SCORE: 0.94

## 2025-05-30 PROBLEM — E66.9 OBESITY (BMI 30-39.9): Status: ACTIVE | Noted: 2025-05-30

## 2025-06-01 ASSESSMENT — PATIENT HEALTH QUESTIONNAIRE - PHQ9
7. TROUBLE CONCENTRATING ON THINGS, SUCH AS READING THE NEWSPAPER OR WATCHING TELEVISION: NOT AT ALL
6. FEELING BAD ABOUT YOURSELF - OR THAT YOU ARE A FAILURE OR HAVE LET YOURSELF OR YOUR FAMILY DOWN: NOT AL ALL
3. TROUBLE FALLING OR STAYING ASLEEP OR SLEEPING TOO MUCH: NOT AT ALL
8. MOVING OR SPEAKING SO SLOWLY THAT OTHER PEOPLE COULD HAVE NOTICED. OR THE OPPOSITE, BEING SO FIGETY OR RESTLESS THAT YOU HAVE BEEN MOVING AROUND A LOT MORE THAN USUAL: NOT AT ALL
1. LITTLE INTEREST OR PLEASURE IN DOING THINGS: NOT AT ALL
4. FEELING TIRED OR HAVING LITTLE ENERGY: NOT AT ALL
SUM OF ALL RESPONSES TO PHQ9 QUESTIONS 1 AND 2: 0
9. THOUGHTS THAT YOU WOULD BE BETTER OFF DEAD, OR OF HURTING YOURSELF: NOT AT ALL
SUM OF ALL RESPONSES TO PHQ QUESTIONS 1-9: 0
2. FEELING DOWN, DEPRESSED, IRRITABLE, OR HOPELESS: NOT AT ALL
5. POOR APPETITE OR OVEREATING: NOT AT ALL

## 2025-06-02 NOTE — PROGRESS NOTES
"Chief Complaint   Patient presents with    Weight Loss     Discuss zepbound       HPI:  Patient is a 44 y.o. female established patient who presents today for a counseling appointment to discuss desire to start Zepbound use for weight loss management assistance. Patient has not been able to lose weight via healthy diet and regular exercise recently.     Patient Active Problem List    Diagnosis Date Noted    Obesity (BMI 30-39.9) 05/30/2025    Perimenopausal symptoms 09/09/2024    Hereditary hearing loss 09/29/2022    Female stress incontinence 04/06/2021    Major depressive disorder 05/27/2020    Primary insomnia 08/19/2019    Cervical high risk human papillomavirus (HPV) DNA test positive 07/15/2019    Vitamin D deficiency 07/12/2019    Mixed dyslipidemia 08/22/2018    TRACEY (generalized anxiety disorder) 11/10/2017    History of recurrent UTI (urinary tract infection) 09/20/2017    Acne vulgaris 09/20/2017    Upper airway resistance syndrome 05/26/2017    AMELIA (obstructive sleep apnea) 04/03/2017       Past medical, surgical, family, and social history was reviewed and updated in Epic chart by me today.     Medications and allergies reviewed and updated in Epic chart by me today.     ROS:  Pertinent positives listed above in HPI. All other systems have been reviewed and are negative.    PE:   /62 (BP Location: Left arm, Patient Position: Sitting, BP Cuff Size: Adult)   Pulse 83   Temp 36.6 °C (97.8 °F) (Temporal)   Resp 17   Ht 1.74 m (5' 8.5\")   Wt 92.1 kg (203 lb)   SpO2 98%   BMI 30.42 kg/m²   Vital signs reviewed with patient.     Gen: Well developed; well nourished; no acute distress; age appropriate appearance   Neuro: No focal deficits noted   Psych: AAOx4; mood and affect are appropriate    A/P:  1. Obesity (BMI 30-39.9) (Primary)  Chronic condition for patient despite healthy diet and regular exercise. She is well versed about GLP-1 medication use and would like to proceed with Zepbound use. " Possible medication side effects discussed at length, and patient encouraged to eat high protein fiber filled foods, do strength training three times per week, stay well hydrated, and continue current exercise habits. New RX sent to pharmacy and will follow clinical response.   - Tirzepatide-Weight Management 2.5 MG/0.5ML Solution Auto-injector; Inject 2.5 mg under the skin every 7 days.  Dispense: 2 mL; Refill: 1    Time spent: 25 minutes

## 2025-07-07 DIAGNOSIS — E66.9 OBESITY (BMI 30-39.9): ICD-10-CM

## 2025-07-22 DIAGNOSIS — E66.9 OBESITY (BMI 30-39.9): ICD-10-CM

## 2025-07-23 ENCOUNTER — OFFICE VISIT (OUTPATIENT)
Dept: INTERNAL MEDICINE | Facility: IMAGING CENTER | Age: 45
End: 2025-07-23
Payer: COMMERCIAL

## 2025-07-23 VITALS
HEIGHT: 69 IN | SYSTOLIC BLOOD PRESSURE: 112 MMHG | TEMPERATURE: 98.7 F | BODY MASS INDEX: 28.58 KG/M2 | HEART RATE: 77 BPM | WEIGHT: 193 LBS | DIASTOLIC BLOOD PRESSURE: 62 MMHG | RESPIRATION RATE: 17 BRPM | OXYGEN SATURATION: 99 %

## 2025-07-23 DIAGNOSIS — H69.91 ACUTE DYSFUNCTION OF RIGHT EUSTACHIAN TUBE: Primary | ICD-10-CM

## 2025-07-23 PROCEDURE — 99213 OFFICE O/P EST LOW 20 MIN: CPT | Performed by: FAMILY MEDICINE

## 2025-07-23 PROCEDURE — 3078F DIAST BP <80 MM HG: CPT | Performed by: FAMILY MEDICINE

## 2025-07-23 PROCEDURE — 3074F SYST BP LT 130 MM HG: CPT | Performed by: FAMILY MEDICINE

## 2025-07-23 ASSESSMENT — FIBROSIS 4 INDEX: FIB4 SCORE: 0.94

## 2025-07-23 NOTE — PROGRESS NOTES
"Chief Complaint   Patient presents with    Ear Fullness     X4 weeks, Right ear intermittently she will hear a \"thumping\" sound and feel pressure. Denies pain or drainage.       HPI:  Patient is a 44 y.o. female established patient who presents today for evaluation of new right ear fullness with intermittent \"thumping\" sound and pressure. This condition has been present for approximately four weeks without known trigger. She denies associated acute illness and hearing remains at baseline.     Patient Active Problem List    Diagnosis Date Noted    Obesity (BMI 30-39.9) 05/30/2025    Perimenopausal symptoms 09/09/2024    Hereditary hearing loss 09/29/2022    Female stress incontinence 04/06/2021    Major depressive disorder 05/27/2020    Primary insomnia 08/19/2019    Cervical high risk human papillomavirus (HPV) DNA test positive 07/15/2019    Vitamin D deficiency 07/12/2019    Mixed dyslipidemia 08/22/2018    TRACEY (generalized anxiety disorder) 11/10/2017    History of recurrent UTI (urinary tract infection) 09/20/2017    Acne vulgaris 09/20/2017    Upper airway resistance syndrome 05/26/2017    AMELIA (obstructive sleep apnea) 04/03/2017       Past medical, surgical, family, and social history was reviewed and updated in Epic chart by me today.     Medications and allergies reviewed and updated in Epic chart by me today.     ROS:  Pertinent positives listed above in HPI. All other systems have been reviewed and are negative.    PE:   /62 (BP Location: Left arm, Patient Position: Sitting, BP Cuff Size: Adult)   Pulse 77   Temp 37.1 °C (98.7 °F) (Temporal)   Resp 17   Ht 1.74 m (5' 8.5\")   Wt 87.5 kg (193 lb)   SpO2 99%   BMI 28.92 kg/m²   Vital signs reviewed with patient.     Gen: Well developed; well nourished; no acute distress; age appropriate appearance   HEENT: Normocephalic; atraumatic; PEERLA b/l; sclera clear b/l; b/l external auditory canals WNL; b/l TM WNL; nares patent; oropharynx clear; oral " mucosa moist; tongue midline; dentition adequate   Neck: No adenopathy; no thyromegaly  CV: Regular rate and rhythm; S1/ S2 present; no murmur, gallop or rub noted  Pulm: No respiratory distress; clear to ascultation b/l; no wheezing or stridor noted b/l  Neuro: No focal deficits noted   Psych: AAOx4; mood and affect are appropriate    A/P:  1. Acute dysfunction of right eustachian tube (Primary)  New condition for patient as detailed above in HPI. I suspect patient is suffering from mild eustachian tube dysfunction of right ear. Recommend Nasacort spray in each nostril daily use and follow clinical response.

## 2025-08-26 DIAGNOSIS — E66.9 OBESITY (BMI 30-39.9): Primary | ICD-10-CM

## (undated) DEVICE — SODIUM CHL. IRRIGATION 0.9% 3000ML (4EA/CA 65CA/PF)

## (undated) DEVICE — TOWEL STOP TIMEOUT SAFETY FLAG (40EA/CA)

## (undated) DEVICE — SODIUM CHL IRRIGATION 0.9% 1000ML (12EA/CA)

## (undated) DEVICE — CATHETER IV 20 GA X 1-1/4 ---SURG.& SDS ONLY--- (50EA/BX)

## (undated) DEVICE — SLEEVE VASO CALF MED - (10PR/CA)

## (undated) DEVICE — PROTECTOR ULNA NERVE - (36PR/CA)

## (undated) DEVICE — TUBING CLEARLINK DUO-VENT - C-FLO (48EA/CA)

## (undated) DEVICE — HEAD HOLDER JUNIOR/ADULT

## (undated) DEVICE — CANISTER SUCTION 3000ML MECHANICAL FILTER AUTO SHUTOFF MEDI-VAC NONSTERILE LF DISP  (40EA/CA)

## (undated) DEVICE — PAD SANITARY 11IN MAXI IND WRAPPED  (12EA/PK 24PK/CA)

## (undated) DEVICE — GOWN WARMING STANDARD FLEX - (30/CA)

## (undated) DEVICE — NEPTUNE 4 PORT MANIFOLD - (20/PK)

## (undated) DEVICE — SUCTION INSTRUMENT YANKAUER BULBOUS TIP W/O VENT (50EA/CA)

## (undated) DEVICE — MASK ANESTHESIA ADULT  - (100/CA)

## (undated) DEVICE — GLOVE BIOGEL PI ORTHO SZ 6 1/2 SURGICAL PF LF (40PR/BX)

## (undated) DEVICE — KIT  I.V. START (100EA/CA)

## (undated) DEVICE — GLOVE BIOGEL PI INDICATOR SZ 7.0 SURGICAL PF LF - (50/BX 4BX/CA)

## (undated) DEVICE — SET LEADWIRE 5 LEAD BEDSIDE DISPOSABLE ECG (1SET OF 5/EA)

## (undated) DEVICE — ELECTRODE DUAL RETURN W/ CORD - (50/PK)

## (undated) DEVICE — GLOVE BIOGEL SZ 6.5 SURGICAL PF LTX (50PR/BX 4BX/CA)

## (undated) DEVICE — BRIEF STRETCH MATERNITY M/L - FITS 20-60IN (5EA/BG 20BG/CA)

## (undated) DEVICE — Device

## (undated) DEVICE — TRAY SRGPRP PVP IOD WT PRP - (20/CA)

## (undated) DEVICE — LACTATED RINGERS INJ 1000 ML - (14EA/CA 60CA/PF)

## (undated) DEVICE — COVER MAYO STAND X-LG - (22EA/CA)

## (undated) DEVICE — GLOVE BIOGEL SZ 6 PF LATEX - (50EA/BX 4BX/CA)

## (undated) DEVICE — ELECTRODE 850 FOAM ADHESIVE - HYDROGEL RADIOTRNSPRNT (50/PK)

## (undated) DEVICE — KIT HTA GENESYS - (5/BX)

## (undated) DEVICE — GLOVE SZ 6.5 BIOGEL PI MICRO - PF LF (50PR/BX)

## (undated) DEVICE — SLEEVE, VASO, THIGH, MED

## (undated) DEVICE — DRESSING NON ADHERENT 3 X 4 - STERILE (100/BX 12BX/CA)

## (undated) DEVICE — KIT ANESTHESIA W/CIRCUIT & 3/LT BAG W/FILTER (20EA/CA)

## (undated) DEVICE — SENSOR SPO2 NEO LNCS ADHESIVE (20/BX) SEE USER NOTES

## (undated) DEVICE — SET EXTENSION WITH 2 PORTS (48EA/CA) ***PART #2C8610 IS A SUBSTITUTE*****

## (undated) DEVICE — JELLY SURGILUBE STERILE FOIL 5 GM (150EA/BX)

## (undated) DEVICE — GLOVE BIOGEL ECLIPSE PF LATEX SIZE 6.0 (50PR/BX)

## (undated) DEVICE — GLOVE SIZE 7.0 SURGEON ACCELERATOR FREE GREEN (50PR/BX 4BX/CA)